# Patient Record
Sex: MALE | Employment: STUDENT | URBAN - METROPOLITAN AREA
[De-identification: names, ages, dates, MRNs, and addresses within clinical notes are randomized per-mention and may not be internally consistent; named-entity substitution may affect disease eponyms.]

---

## 2017-03-23 ENCOUNTER — OFFICE VISIT (OUTPATIENT)
Dept: DERMATOLOGY | Facility: CLINIC | Age: 22
End: 2017-03-23

## 2017-03-23 DIAGNOSIS — L63.9 AA (ALOPECIA AREATA): Primary | ICD-10-CM

## 2017-03-23 DIAGNOSIS — L63.9 AA (ALOPECIA AREATA): ICD-10-CM

## 2017-03-23 LAB
DEPRECATED CALCIDIOL+CALCIFEROL SERPL-MC: 20 UG/L (ref 20–75)
FERRITIN SERPL-MCNC: 32 NG/ML (ref 26–388)
IRON SATN MFR SERPL: 24 % (ref 15–46)
IRON SERPL-MCNC: 107 UG/DL (ref 35–180)
TIBC SERPL-MCNC: 453 UG/DL (ref 240–430)
TSH SERPL DL<=0.005 MIU/L-ACNC: 1.66 MU/L (ref 0.4–4)

## 2017-03-23 RX ORDER — CLOBETASOL PROPIONATE 0.05 G/100ML
SHAMPOO TOPICAL
Qty: 118 ML | Refills: 3 | Status: SHIPPED | OUTPATIENT
Start: 2017-03-23 | End: 2017-03-23

## 2017-03-23 RX ORDER — CLOBETASOL PROPIONATE 0.05 G/100ML
SHAMPOO TOPICAL
Qty: 118 ML | Refills: 3 | Status: SHIPPED | OUTPATIENT
Start: 2017-03-23 | End: 2018-10-03

## 2017-03-23 ASSESSMENT — PAIN SCALES - GENERAL: PAINLEVEL: MODERATE PAIN (4)

## 2017-03-23 NOTE — MR AVS SNAPSHOT
After Visit Summary   3/23/2017    Kevin Agee    MRN: [id number]           Patient Information     Date Of Birth          1995        Visit Information        Provider Department      3/23/2017 9:45 AM Lucita Bailey MD [facility] Dermatology        Today's Diagnoses     AA (alopecia areata)    -  1       Follow-ups after your visit        Who to contact     Please call your clinic at [phone] to:    Ask questions about your health    Make or cancel appointments    Discuss your medicines    Learn about your test results    Speak to your doctor            Additional Information About Your Visit        MyChart Information     [employer] gives you secure access to your electronic health record. If you see a primary care provider, you can also send messages to your care team and make appointments. If you have questions, please call your primary care clinic.  If you do not have a primary care provider, please call [phone] and they will assist you.      [employer] is an electronic gateway that provides easy, online access to your medical records. With [employer], you can request a clinic appointment, read your test results, renew a prescription or communicate with your care team.     To access your existing account, please contact your [facility] or call [phone] for assistance.        Care EveryWhere ID     This is your Care EveryWhere ID. This could be used by other organizations to access your [place] medical records  [id number]         Blood Pressure from Last 3 Encounters:   No data found for BP    Weight from Last 3 Encounters:   No data found for Wt              We Performed the Following     FERRITIN     INJECTION INTO SKIN LESIONS >7     IRON AND IRON BINDING CAPACITY     Thyroid Peroxidase and Thyroglob Atb (Qu          Today's Medication Changes          These changes are accurate as of 3/23/17 11:59 PM.  If you have any questions, ask your  nurse or doctor.               Start taking these medicines.        Dose/Directions    clobetasol propionate 0.05 % Sham   Used for:  AA (alopecia areata)   Started by:  Lucita Bailey MD        Apply to entire scalp then wash out three times per week   Quantity:  118 mL   Refills:  3       triamcinolone acetonide 10 MG/ML injection   Commonly known as:  KENALOG   Used for:  AA (alopecia areata)   Started by:  Lucita Bailey MD        Dose:  20 mg   Inject 2 mLs (20 mg) into the skin once for 1 dose   Quantity:  2 mL   Refills:  0            Where to get your medicines      Some of these will need a paper prescription and others can be bought over the counter.  Ask your nurse if you have questions.     Bring a paper prescription for each of these medications     clobetasol propionate 0.05 % Sham       You don't need a prescription for these medications     triamcinolone acetonide 10 MG/ML injection                Primary Care Provider Fax #    Physician No Ref-Primary [phone]       No address on file        Equal Access to Services     RIGO DUONG : Brandon ulricho Sobrittany, waaxda lubenson, qaybta kaalmada ademandy, cheng adkins . So [facility] [phone].    ATENCIÓN: Si patti dimas, tiene a rizvi disposición servicios gratuitos de asistencia lingüística. Llame al [phone].    We comply with applicable federal civil rights laws and [place] laws. We do not discriminate on the basis of race, color, national origin, age, disability, sex, sexual orientation, or gender identity.            Thank you!     Thank you for choosing [facility] DERMATOLOGY  for your care. Our goal is always to provide you with excellent care. Hearing back from our patients is one way we can continue to improve our services. Please take a few minutes to complete the written survey that you may receive in the mail after your visit with us. Thank you!             Your Updated Medication List -  Protect others around you: Learn how to safely use, store and throw away your medicines at www.disposemymeds.org.          This list is accurate as of 3/23/17 11:59 PM.  Always use your most recent med list.                   Brand Name Dispense Instructions for use Diagnosis    clobetasol propionate 0.05 % Sham     118 mL    Apply to entire scalp then wash out three times per week    AA (alopecia areata)       triamcinolone acetonide 10 MG/ML injection    KENALOG    2 mL    Inject 2 mLs (20 mg) into the skin once for 1 dose    AA (alopecia areata)

## 2017-03-23 NOTE — NURSING NOTE
Dermatology Rooming Note    Kevin Agee's goals for this visit include:   Chief Complaint   Patient presents with     Consult     Alopecia Areata, this last month it has been getting worse.      Parisa Jiménez CMA

## 2017-03-25 NOTE — PROGRESS NOTES
[facility] Dermatology Note  Encounter Date: Mar 23, 2017    CC:   Chief Complaint   Patient presents with     Consult     Alopecia Areata, this last month it has been getting worse.      Dermatology Problem List:  -Alopecia Areata: Clobetasol shampoo three times per week, ILK    History of Present Illness:  This 21 year old male presents for evaluation of alopecia areata. He first noticed the patches of hair loss about 2 years ago, but started treatment about a year ago at [facility] where he has received 6 injections with ILK, which have been effective in improving the injected areas, but he continues to get new ones. He has had about 8 new spots in the last 6 months. His last ILK was 1 month ago. He denies any itching or scaling of his scalp. He also denies any loss of eyebrows or body hair, or nail changes. He is otherwise in his usual state of health and denies weight changes, fever/chills, constipation or diarrhea, or mood changes.         The patient's father also has AA, but has never been treated. He denies any history of thyroid disease.     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     Family History:  History reviewed. No pertinent family history.       Medications:  Current Outpatient Prescriptions   Medication Sig Dispense Refill     clobetasol propionate 0.05 % SHAM Apply to entire scalp then wash out three times per week 118 mL 3     Allergies:  No Known Allergies  Review of Systems:  Skin/Heme New Pt: The patient denies frequent sun exposure. The patient denies excessive scarring or problems healing except as per HPI. The patient denies excessive bleeding.  Constitutional: The patient denies fatigue, fevers,  chills, unintended weight loss, and night sweats.    Physical exam:  There were no vitals taken for this visit.  - This is a well developed, well-nourished male in no acute distress, in a pleasant mood.    - Lynn Skin Type IV  - Sun-exposed skin, which includes the head/face, neck, both arms, digits, and/or nails was examined.   - Scattered round patches of hair loss without associated scarring measuring 2-3 cm on the right posterior scalp with scattered small 4-5mm vellus and terminal, with scattered ~1cm areas adjacent, and an additional patch on the left temporal hairline measuring ~1-2cm.  There was mild erythema and scaling of the scalp noted.  Eyelashes and eyebrows are within normal limits. Nails are without pitting, onycholysis or onychodystrophy.            Impression/Plan:  1. Alopecia Areata: Discussed that this is a disease where the body's immune system attacks the hair follicles and that it can be associated with other autoimmune diseases (most commonly thyroid disease)  TSH with free T4 reflex; Iron studies, Vitamin D, Thyroid Peroxidase/Thyroglobulin Antibodies     Clobetasol propionate 0.05 % SHAM; Apply to entire scalp then wash out three times per week  Dispense: 118 mL; Refill: 3  Kenalog intralesional injection procedure note: After verbal consent and discussion of risks including but not limited to atrophy, pain, and bruising, time out was performed, the patient underwent positioning and the area was prepped with isopropyl alcohol, 2.0 total cc of Kenalog 10 mg/cc was injected into 20 lesion(s) on the posterior, and temporal scalp.  The patient tolerated the procedure well and left the Dermatology clinic in good condition.      FERN Ross Ref-Primary, Physician Referred MD Omari  No address on file on close of this encounter.  Follow-up in 4 weeks, earlier for new or changing lesions.     Lucita Bailey MD  PGY-3 Internal Medicine/ Dermatology  [phone]    Adeline Antonio  staffed the patient.    Staff Involved:  Resident(Lucita Bailey)/Staff(as above)

## 2017-04-05 ENCOUNTER — HOSPITAL ENCOUNTER (EMERGENCY)
Facility: CLINIC | Age: 22
Discharge: HOME | End: 2017-04-05
Attending: EMERGENCY MEDICINE | Admitting: EMERGENCY MEDICINE
Payer: COMMERCIAL

## 2017-04-05 VITALS
BODY MASS INDEX: 22.38 KG/M2 | SYSTOLIC BLOOD PRESSURE: 139 MMHG | RESPIRATION RATE: 18 BRPM | OXYGEN SATURATION: 98 % | HEIGHT: 75 IN | TEMPERATURE: 98.9 F | DIASTOLIC BLOOD PRESSURE: 76 MMHG | WEIGHT: 180 LBS

## 2017-04-05 DIAGNOSIS — R19.7 DIARRHEA, UNSPECIFIED TYPE: ICD-10-CM

## 2017-04-05 DIAGNOSIS — R10.31 ABDOMINAL PAIN, RIGHT LOWER QUADRANT: ICD-10-CM

## 2017-04-05 LAB
ALBUMIN SERPL-MCNC: 4.1 G/DL (ref 3.4–5)
ALBUMIN UR-MCNC: 10 MG/DL
ALP SERPL-CCNC: 112 U/L (ref 65–260)
ALT SERPL W P-5'-P-CCNC: 54 U/L (ref 0–50)
ANION GAP SERPL CALCULATED.3IONS-SCNC: 9 MMOL/L (ref 3–14)
APPEARANCE UR: CLEAR
AST SERPL W P-5'-P-CCNC: 27 U/L (ref 0–35)
BACTERIA #/AREA URNS HPF: ABNORMAL /HPF
BASOPHILS # BLD AUTO: 0 10E9/L (ref 0–0.2)
BASOPHILS NFR BLD AUTO: 0.4 %
BILIRUB SERPL-MCNC: 0.4 MG/DL (ref 0.2–1.3)
BILIRUB UR QL STRIP: NEGATIVE
BUN SERPL-MCNC: 18 MG/DL (ref 7–30)
CALCIUM SERPL-MCNC: 9.1 MG/DL (ref 8.5–10.1)
CHLORIDE SERPL-SCNC: 104 MMOL/L (ref 98–110)
CO2 SERPL-SCNC: 23 MMOL/L (ref 20–32)
COLOR UR AUTO: YELLOW
CREAT SERPL-MCNC: 1.44 MG/DL (ref 0.5–1)
DIFFERENTIAL METHOD BLD: NORMAL
EOSINOPHIL # BLD AUTO: 0 10E9/L (ref 0–0.7)
EOSINOPHIL NFR BLD AUTO: 0.4 %
ERYTHROCYTE [DISTWIDTH] IN BLOOD BY AUTOMATED COUNT: 12.9 % (ref 10–15)
GFR SERPL CREATININE-BSD FRML MDRD: 63 ML/MIN/1.7M2
GLUCOSE SERPL-MCNC: 126 MG/DL (ref 70–99)
GLUCOSE UR STRIP-MCNC: NEGATIVE MG/DL
HCT VFR BLD AUTO: 46.3 % (ref 40–53)
HGB BLD-MCNC: 15.6 G/DL (ref 13.3–17.7)
HGB UR QL STRIP: NEGATIVE
IMM GRANULOCYTES # BLD: 0 10E9/L (ref 0–0.4)
IMM GRANULOCYTES NFR BLD: 0.2 %
KETONES UR STRIP-MCNC: 10 MG/DL
LACTATE BLD-SCNC: 1.8 MMOL/L (ref 0.7–2.1)
LEUKOCYTE ESTERASE UR QL STRIP: NEGATIVE
LIPASE SERPL-CCNC: 175 U/L (ref 73–393)
LYMPHOCYTES # BLD AUTO: 0.9 10E9/L (ref 0.8–5.3)
LYMPHOCYTES NFR BLD AUTO: 21.1 %
MCH RBC QN AUTO: 30.9 PG (ref 26.5–33)
MCHC RBC AUTO-ENTMCNC: 33.7 G/DL (ref 31.5–36.5)
MCV RBC AUTO: 92 FL (ref 78–100)
MONOCYTES # BLD AUTO: 1.2 10E9/L (ref 0–1.3)
MONOCYTES NFR BLD AUTO: 26.3 %
MUCOUS THREADS #/AREA URNS LPF: PRESENT /LPF
NEUTROPHILS # BLD AUTO: 2.3 10E9/L (ref 1.6–8.3)
NEUTROPHILS NFR BLD AUTO: 51.6 %
NITRATE UR QL: NEGATIVE
NRBC # BLD AUTO: 0 10*3/UL
NRBC BLD AUTO-RTO: 0 /100
PH UR STRIP: 6 PH (ref 5–7)
PLATELET # BLD AUTO: 160 10E9/L (ref 150–450)
PLATELET # BLD EST: NORMAL 10*3/UL
POTASSIUM SERPL-SCNC: 4 MMOL/L (ref 3.4–5.3)
PROT SERPL-MCNC: 7.9 G/DL (ref 6.8–8.8)
RBC # BLD AUTO: 5.05 10E12/L (ref 4.4–5.9)
RBC #/AREA URNS AUTO: <1 /HPF (ref 0–2)
SODIUM SERPL-SCNC: 136 MMOL/L (ref 133–144)
SP GR UR STRIP: 1.02 (ref 1–1.03)
SQUAMOUS #/AREA URNS AUTO: 1 /HPF (ref 0–1)
TRANS CELLS #/AREA URNS HPF: 1 /HPF (ref 0–1)
URN SPEC COLLECT METH UR: ABNORMAL
UROBILINOGEN UR STRIP-MCNC: NORMAL MG/DL (ref 0–2)
WBC # BLD AUTO: 4.5 10E9/L (ref 4–11)
WBC #/AREA URNS AUTO: 1 /HPF (ref 0–2)

## 2017-04-05 PROCEDURE — 99284 EMERGENCY DEPT VISIT MOD MDM: CPT | Mod: Z6 | Performed by: EMERGENCY MEDICINE

## 2017-04-05 PROCEDURE — 83690 ASSAY OF LIPASE: CPT | Performed by: EMERGENCY MEDICINE

## 2017-04-05 PROCEDURE — 85025 COMPLETE CBC W/AUTO DIFF WBC: CPT | Performed by: EMERGENCY MEDICINE

## 2017-04-05 PROCEDURE — 25000128 H RX IP 250 OP 636: Performed by: EMERGENCY MEDICINE

## 2017-04-05 PROCEDURE — 96374 THER/PROPH/DIAG INJ IV PUSH: CPT | Performed by: EMERGENCY MEDICINE

## 2017-04-05 PROCEDURE — 96361 HYDRATE IV INFUSION ADD-ON: CPT | Performed by: EMERGENCY MEDICINE

## 2017-04-05 PROCEDURE — 83605 ASSAY OF LACTIC ACID: CPT | Performed by: EMERGENCY MEDICINE

## 2017-04-05 PROCEDURE — 80053 COMPREHEN METABOLIC PANEL: CPT | Performed by: EMERGENCY MEDICINE

## 2017-04-05 PROCEDURE — 99284 EMERGENCY DEPT VISIT MOD MDM: CPT | Mod: 25 | Performed by: EMERGENCY MEDICINE

## 2017-04-05 PROCEDURE — 81001 URINALYSIS AUTO W/SCOPE: CPT | Performed by: EMERGENCY MEDICINE

## 2017-04-05 RX ORDER — LIDOCAINE 40 MG/G
CREAM TOPICAL
Status: DISCONTINUED | OUTPATIENT
Start: 2017-04-05 | End: 2017-04-05 | Stop reason: HOSPADM

## 2017-04-05 RX ORDER — SODIUM CHLORIDE 9 MG/ML
INJECTION, SOLUTION INTRAVENOUS CONTINUOUS
Status: DISCONTINUED | OUTPATIENT
Start: 2017-04-05 | End: 2017-04-05 | Stop reason: HOSPADM

## 2017-04-05 RX ORDER — ONDANSETRON 2 MG/ML
4 INJECTION INTRAMUSCULAR; INTRAVENOUS ONCE
Status: COMPLETED | OUTPATIENT
Start: 2017-04-05 | End: 2017-04-05

## 2017-04-05 RX ADMIN — ONDANSETRON 4 MG: 2 INJECTION INTRAMUSCULAR; INTRAVENOUS at 09:17

## 2017-04-05 RX ADMIN — SODIUM CHLORIDE 2000 ML: 9 INJECTION, SOLUTION INTRAVENOUS at 09:17

## 2017-04-05 ASSESSMENT — ENCOUNTER SYMPTOMS
DIARRHEA: 1
APPETITE CHANGE: 1
VOMITING: 0
ARTHRALGIAS: 0
CONFUSION: 0
COLOR CHANGE: 0
ABDOMINAL PAIN: 1
FEVER: 1
NAUSEA: 1
SHORTNESS OF BREATH: 0
HEADACHES: 0
DIFFICULTY URINATING: 0
BLOOD IN STOOL: 0
NECK STIFFNESS: 0
EYE REDNESS: 0

## 2017-04-05 NOTE — ED NOTES
Kevin presents with c/o RLQ abdominal pain that radiates to his umbilicus since yesterday. He also reports nausea, one episode of watery stool and a temp of 101. Denies sick contacts, recent travel or rash.Denies relevant medical history or current medications.

## 2017-04-05 NOTE — ED PROVIDER NOTES
History     Chief Complaint   Patient presents with     Abdominal Pain     HPI  Kevin Agee is a 21 year old male who presents to the Emergency Department for evaluation of abdominal pain. Last night, the patient developed a fever recorded at 101 F and nausea. This morning he woke around 1:00 AM (~9 hours ago) with generalized abdominal pain, worse in the RLQ, described as an ache. His pain has been intermittent, lasting anywhere from a few minutes to 30 minutes, and has been increasing in frequency and raditing to his right lower back. Twisting his abdomen and palpitations exacerbates his pain. He has not taking anything for his discomfort and currently radiates it at 3/10. Additionally, he reports 2 watery diarrhea episodes per day for the past week, attributing it to eating dried mangos. He has not taken anything for the diarrhea. He has not experienced these symptoms before. His last meal was last night and states he does not have an appetite this morning. He denies vomiting, bloody stools or any other concerns or complaints at this time. No abdominal surgical history. No history or known family history of kidney stones. No recent travel. No antibiotic use. Not anticoagulated on aspirin or otherwise.    Past Medical History:   Diagnosis Date     Flat feet, bilateral      Migraine        Past Surgical History:   Procedure Laterality Date     ANKLE SURGERY      To fix bilateral flat feet       No family history on file.    Social History   Substance Use Topics     Smoking status: Never Smoker     Smokeless tobacco: Not on file     Alcohol use 0.0 oz/week     0 Standard drinks or equivalent per week      Comment: binge       Current Facility-Administered Medications   Medication     lidocaine 1 % 1 mL     lidocaine (LMX4) kit     sodium chloride (PF) 0.9% PF flush 3 mL     sodium chloride (PF) 0.9% PF flush 3 mL     0.9% sodium chloride infusion     Current Outpatient Prescriptions   Medication      "clobetasol propionate 0.05 % SHAM      No Known Allergies    I have reviewed the Medications, Allergies, Past Medical and Surgical History, and Social History in the Epic system.    Review of Systems   Constitutional: Positive for appetite change (decreased) and fever (101 F).   HENT: Negative for congestion.    Eyes: Negative for redness.   Respiratory: Negative for shortness of breath.    Cardiovascular: Negative for chest pain.   Gastrointestinal: Positive for abdominal pain, diarrhea and nausea. Negative for blood in stool and vomiting.   Genitourinary: Negative for difficulty urinating.   Musculoskeletal: Negative for arthralgias and neck stiffness.   Skin: Negative for color change.   Neurological: Negative for headaches.   Psychiatric/Behavioral: Negative for confusion.   All other systems reviewed and are negative.      Physical Exam   BP: 120/67  Heart Rate: 97  Temp: 98.9  F (37.2  C)  Height: 190.5 cm (6' 3\")  Weight: 81.6 kg (180 lb)  SpO2: 99 %  Physical Exam   Constitutional: No distress.   HENT:   Head: Atraumatic.   Mouth/Throat: Oropharynx is clear and moist. No oropharyngeal exudate.   Eyes: Pupils are equal, round, and reactive to light. No scleral icterus.   Cardiovascular: Normal heart sounds and intact distal pulses.    Pulmonary/Chest: Breath sounds normal. No respiratory distress.   Abdominal: Soft. Bowel sounds are normal. There is no tenderness.   Musculoskeletal: He exhibits no edema or tenderness.   Skin: Skin is warm. No rash noted. He is not diaphoretic.       ED Course     8:58 AM  The patient was seen and examined by Dr. Zaragoza in Room 15.     ED Course     Procedures        Results for orders placed or performed during the hospital encounter of 04/05/17   CBC with platelets differential   Result Value Ref Range    WBC 4.5 4.0 - 11.0 10e9/L    RBC Count 5.05 4.4 - 5.9 10e12/L    Hemoglobin 15.6 13.3 - 17.7 g/dL    Hematocrit 46.3 40.0 - 53.0 %    MCV 92 78 - 100 fl    MCH 30.9 26.5 - " 33.0 pg    MCHC 33.7 31.5 - 36.5 g/dL    RDW 12.9 10.0 - 15.0 %    Platelet Count 160 150 - 450 10e9/L    Diff Method Automated Method     % Neutrophils 51.6 %    % Lymphocytes 21.1 %    % Monocytes 26.3 %    % Eosinophils 0.4 %    % Basophils 0.4 %    % Immature Granulocytes 0.2 %    Nucleated RBCs 0 0 /100    Absolute Neutrophil 2.3 1.6 - 8.3 10e9/L    Absolute Lymphocytes 0.9 0.8 - 5.3 10e9/L    Absolute Monocytes 1.2 0.0 - 1.3 10e9/L    Absolute Eosinophils 0.0 0.0 - 0.7 10e9/L    Absolute Basophils 0.0 0.0 - 0.2 10e9/L    Abs Immature Granulocytes 0.0 0 - 0.4 10e9/L    Absolute Nucleated RBC 0.0     Platelet Estimate Confirming automated cell count    Comprehensive metabolic panel   Result Value Ref Range    Sodium 136 133 - 144 mmol/L    Potassium 4.0 3.4 - 5.3 mmol/L    Chloride 104 98 - 110 mmol/L    Carbon Dioxide 23 20 - 32 mmol/L    Anion Gap 9 3 - 14 mmol/L    Glucose 126 (H) 70 - 99 mg/dL    Urea Nitrogen 18 7 - 30 mg/dL    Creatinine 1.44 (H) 0.50 - 1.00 mg/dL    GFR Estimate 63 >60 mL/min/1.7m2    GFR Estimate If Black 76 >60 mL/min/1.7m2    Calcium 9.1 8.5 - 10.1 mg/dL    Bilirubin Total 0.4 0.2 - 1.3 mg/dL    Albumin 4.1 3.4 - 5.0 g/dL    Protein Total 7.9 6.8 - 8.8 g/dL    Alkaline Phosphatase 112 65 - 260 U/L    ALT 54 (H) 0 - 50 U/L    AST 27 0 - 35 U/L   Routine UA with microscopic   Result Value Ref Range    Color Urine Yellow     Appearance Urine Clear     Glucose Urine Negative NEG mg/dL    Bilirubin Urine Negative NEG    Ketones Urine 10 (A) NEG mg/dL    Specific Gravity Urine 1.025 1.003 - 1.035    Blood Urine Negative NEG    pH Urine 6.0 5.0 - 7.0 pH    Protein Albumin Urine 10 (A) NEG mg/dL    Urobilinogen mg/dL Normal 0.0 - 2.0 mg/dL    Nitrite Urine Negative NEG    Leukocyte Esterase Urine Negative NEG    Source Midstream Urine     WBC Urine 1 0 - 2 /HPF    RBC Urine <1 0 - 2 /HPF    Bacteria Urine Few (A) NEG /HPF    Squamous Epithelial /HPF Urine 1 0 - 1 /HPF    Transitional Epi 1 0 -  1 /HPF    Mucous Urine Present (A) NEG /LPF   Lactic acid   Result Value Ref Range    Lactic Acid 1.8 0.7 - 2.1 mmol/L   Lipase   Result Value Ref Range    Lipase 175 73 - 393 U/L     Medications   lidocaine 1 % 1 mL (not administered)   lidocaine (LMX4) kit (not administered)   sodium chloride (PF) 0.9% PF flush 3 mL (not administered)   sodium chloride (PF) 0.9% PF flush 3 mL (3 mLs Intracatheter Given 4/5/17 0919)   0.9% sodium chloride infusion (not administered)   0.9% sodium chloride BOLUS (0 mLs Intravenous Stopped 4/5/17 1111)   ondansetron (ZOFRAN) injection 4 mg (4 mg Intravenous Given 4/5/17 0917)            Labs Ordered and Resulted from Time of ED Arrival Up to the Time of Departure from the ED   COMPREHENSIVE METABOLIC PANEL - Abnormal; Notable for the following:        Result Value    Glucose 126 (*)     Creatinine 1.44 (*)     ALT 54 (*)     All other components within normal limits   ROUTINE UA WITH MICROSCOPIC - Abnormal; Notable for the following:     Ketones Urine 10 (*)     Protein Albumin Urine 10 (*)     Bacteria Urine Few (*)     Mucous Urine Present (*)     All other components within normal limits   CBC WITH PLATELETS DIFFERENTIAL   LACTIC ACID WHOLE BLOOD   LIPASE   PERIPHERAL IV CATHETER       Assessments & Plan (with Medical Decision Making)   21-year-old male presents for evaluation of 7 day history of diarrhea, crampy abdominal pain now localizing to right lower quadrant and low grade temperature.  Exam was entirely unremarkable including abdominal exam revealing no evidence of tenderness.  Laboratories were obtained revealing a CBC that was normal.  Comprehensive metabolic panel revealed slight elevation in glucose 126 and creatinine of 1.44 consistent with stress response and diarrhea.  Lactic acid, lipase and urinalysis were grossly unremarkable.  Patient received fluids through IV and Zofran.  He felt significantly improved.  Repeat exam was unremarkable.  We discussed etiology of  his pain which includes gastroenteritis, colitis, appendicitis.  At this point as patient has significantly improved and has no abdominal findings, we have deferred imaging.  I've asked him to return to the emergency room with recurrence or worsening of any of his symptoms.    I have reviewed the nursing notes.    I have reviewed the findings, diagnosis, plan and need for follow up with the patient.    New Prescriptions    No medications on file       Final diagnoses:   Abdominal pain, right lower quadrant   Diarrhea, unspecified type     I, Leonora Hein, am serving as a trained medical scribe to document services personally performed by Albert Zaragoza MD, based on the provider's statements to me.      I, Albert Zaragoza MD, was physically present and have reviewed and verified the accuracy of this note documented by Leonora Hein.     4/5/2017   [facility], [place], EMERGENCY DEPARTMENT     Albert Zaragoza MD  04/05/17 0573

## 2017-04-05 NOTE — DISCHARGE INSTRUCTIONS
Treating Diarrhea  Diarrhea occurs when you have loose, watery, or frequent bowel movements. It is a common problem with many causes. Most cases of diarrhea clear up on their own. But certain cases may need treatment. Be sure to see your health care provider if your symptoms do not improve within a few days.    Getting Relief  Treatment of diarrhea depends on its cause. Diarrhea caused by bacterial or parasite infection is often treated with antibiotics. Diarrhea caused by other factors, such as a stomach virus, often improves with simple home treatment. The tips below may also help relieve your symptoms.    Drink plenty of fluids. This helps prevent too much fluid loss (dehydration). Water, clear soups, and electrolyte solutions are good choices. Avoid alcohol, coffee, tea, and milk. These can make symptoms worse.    Suck on ice chips if drinking makes you queasy.    Return to your normal diet slowly. You may want to eat bland foods at first, such as rice and toast. Also, you may need to avoid certain foods for a while, such as dairy products. These can make symptoms worse. Ask your health care provider if there are any other foods you should avoid.    If you were prescribed antibiotics, take them as directed.    Do not take anti-diarrhea medications without asking your health care provider first.  Call Your Health Care Provider If You Have:    Fever of 102 F (38.0 C) or higher    Severe pain    Worsening diarrhea or diarrhea for more than 2 days    Bloody vomit or stool    Signs of dehydration (dizziness, dry mouth and tongue, rapid pulse, dark urine)    0164-3232 [employer]. [address] [place] [address], [place] [address]. All rights reserved. This information is not intended as a substitute for professional medical care. Always follow your healthcare professional's instructions.          Uncertain Causes of Diarrhea (Adult)    Diarrhea is when stools are loose and watery. This can be caused  by:    Viral infections    Bacterial infections    Food poisoning    Parasites    Irritable bowel syndrome (IBS)    Inflammatory bowel diseases such as ulcerative colitis, Crohn's disease, and celiac disease    Food intolerance, such as to lactose, the sugar found in milk and milk products    Reaction to medicines like antibiotics, laxatives, cancer drugs, and antacids  Along with diarrhea, you may also have:    Abdominal pain and cramping    Nausea and vomiting    Loss of bowel control    Fever and chills    Bloody stools  In some cases, antibiotics may help to treat diarrhea. You may have a stool sample test. This is done to see what is causing your diarrhea, and if antibiotics will help treat it. The results of a stool sample test may take up to 2 days. The healthcare provider may not give you antibiotics until he or she has the stool test results.  Diarrhea can cause dehydration. This is the loss of too much water and other fluids from the body. When this occurs, body fluid must be replaced. This can be done with oral rehydration solutions. Oral rehydration solutions are available at drugstores and grocery stores without a prescription.  Home care  Follow all instructions given by your healthcare provider. Rest at home for the next 24 hours, or until you feel better. Avoid caffeine, tobacco, and alcohol. These can make diarrhea, cramping, and pain worse.  If taking medicines:    Don t take over-the-counter diarrhea or nausea medicines unless your healthcare provider tells you to.    You may use acetaminophen or NSAID medicines like ibuprofen or naproxen to reduce pain and fever. Don t use these if you have chronic liver or kidney disease, or ever had a stomach ulcer or gastrointestinal bleeding. Don't use NSAID medicines if you are already taking one for another condition (like arthritis) or are on daily aspirin therapy (such as for heart disease or after a stroke). Talk with your healthcare provider  first.    If antibiotics were prescribed, be sure you take them until they are finished. Don t stop taking them even when you feel better. Antibiotics must be taken as a full course.  To prevent the spread of illness:    Remember that washing with soap and water and using alcohol-based  is the best way to prevent the spread of infection.    Clean the toilet after each use.    Wash your hands before eating.    Wash your hands before and after preparing food. Keep in mind that people with diarrhea or vomiting should not prepare food for others.    Wash your hands after using cutting boards, countertops, and knives that have been in contact with raw foods.    Wash and then peel fruits and vegetables.    Keep uncooked meats away from cooked and ready-to-eat foods.    Use a food thermometer when cooking. Cook poultry to at least 165 F (74 C). Cook ground meat (beef, veal, pork, lamb) to at least 160 F (71 C). Cook fresh beef, veal, lamb, and pork to at least 145 F (63 C).    Don t eat raw or undercooked eggs (poached or bee side up), poultry, meat, or unpasteurized milk and juices.  Food and drinks  The main goal while treating vomiting or diarrhea is to prevent dehydration. This is done by taking small amounts of liquids often.    Keep in mind that liquids are more important than food right now.    Drink only small amounts of liquids at a time.    Don t force yourself to eat, especially if you are having cramping, vomiting, or diarrhea. Don t eat large amounts at a time, even if you are hungry.    If you eat, avoid fatty, greasy, spicy, or fried foods.    Don t eat dairy foods or drink milk if you have diarrhea. These can make diarrhea worse.  During the first 24 hours you can try:    Oral rehydration solutions. Do not use sports drinks. They have too much sugar and not enough electrolytes.    Soft drinks without caffeine    Ginger ale    Water (plain or flavored)    Decaf tea or coffee    Clear broth,  consommé, or bouillon    Gelatin, popsicles, or frozen fruit juice bars  The second 24 hours, if you are feeling better, you can add:    Hot cereal, plain toast, bread, rolls, or crackers    Plain noodles, rice, mashed potatoes, chicken noodle soup, or rice soup    Unsweetened canned fruit (no pineapple)    Bananas  As you recover:    Limit fat intake to less than 15 grams per day. Don t eat margarine, butter, oils, mayonnaise, sauces, gravies, fried foods, peanut butter, meat, poultry, or fish.    Limit fiber. Don t eat raw or cooked vegetables, fresh fruits except bananas, or bran cereals.    Limit caffeine and chocolate.    Limit dairy.    Don t use spices or seasonings except salt.    Go back to your normal diet over time, as you feel better and your symptoms improve.    If the symptoms come back, go back to a simple diet or clear liquids.  Follow-up care  Follow up with your healthcare provider, or as advised. If a stool sample was taken or cultures were done, call the healthcare provider for the results as instructed.  Call 911  Call 911 if you have any of these symptoms:    Trouble breathing    Confusion    Extreme drowsiness or trouble walking    Loss of consciousness    Rapid heart rate    Chest pain    Stiff neck    Seizure  When to seek medical advice  Call your healthcare provider right away if any of these occur:    Abdominal pain that gets worse    Constant lower right abdominal pain    Continued vomiting and inability to keep liquids down    Diarrhea more than 5 times a day    Blood in vomit or stool    Dark urine or no urine for 8 hours, dry mouth and tongue, tiredness, weakness, or dizziness    Drowsiness    New rash    You don t get better in 2 to 3 days    Fever of 100.4 F (38 C) or higher that doesn t get lower with medicine    6129-3922 [employer]. [address] [place] [address], [place] [address]. All rights reserved. This information is not intended as a substitute for professional  medical care. Always follow your healthcare professional's instructions.          Diet for Vomiting or Diarrhea (Adult)    If your symptoms return or get worse after eating certain foods listed below, you should stop eating them until your symptoms ease and you feel better.  Once the vomiting stops, then follow the steps below.   During the first 12 to 24 hours  During the first 12 to 24 hours, follow this diet:    Beverages. Plain water, sport drinks like electrolyte solutions, soft drinks without caffeine, mineral water (plain or flavored), clear fruit juices, and decaffeinated tea and coffee.    Soups. Clear broth, consommé, and bouillon.    Desserts. Plain gelatin, popsicles, and fruit juice bars. As you feel better, you may add 6 to 8 ounces of yogurt per day. If you have diarrhea, don't have foods or beverages that contain sugar, high-fructose corn syrup, or sugar alcohols.  During the next 24 hours  During the next 24 hours you may add the following to the above:    Hot cereal, plain toast, bread, rolls, and crackers    Plain noodles, rice, mashed potatoes, and chicken noodle or rice soup    Unsweetened canned fruit (but not pineapple) and bananas  Don't have more than 15 grams of fat a day. Do this by staying away from margarine, butter, oils, mayonnaise, sauces, gravies, fried foods, peanut butter, meat, poultry, and fish.  Don't eat much fiber. Stay away from raw or cooked vegetables, fresh fruits (except bananas), and bran cereals.  Limit how much caffeine and chocolate you have. Do not use any spices or seasonings except salt.  During the next 24 hours  Gradually go back to your normal diet, as you feel better and your symptoms ease.    2704-9982 [employer]. [address] [place] [address], [place] [address]. All rights reserved. This information is not intended as a substitute for professional medical care. Always follow your healthcare professional's instructions.        * Abdominal  Pain,Uncertain Cause [Male]  Based on your visit today, the exact cause of your abdominal (stomach) pain is not clear. Your exam and tests do not indicate a dangerous cause at this time. However, the signs of a serious problem may take more time to appear. Although your exam was reassuring today, sometimes early in the course of many conditions, exam and lab tests can appear normal. Therefore, it is important for you to watch for any new symptoms or worsening of your condition.  Causes:  It may not be obvious what caused your symptoms. Pay attention to things that do seem to make your symptoms worse or better and discuss this with your doctor when you follow up.  Diagnosis:  The evaluation of abdominal pain in the emergency department may only require an exam by the doctor or it may include blood, urine or imaging studies, depending on many factors. Sometimes exams and tests can identify a cause but in many cases, a clear cause is not found. Further testing at follow up visits may help to suggest a clear diagnosis.  Home Care:    Rest as much as possible until your next exam.    Try to avoid any medications (unless otherwise directed by your doctor), foods, activities, or other factors that you may have contributed to your symptoms.    Try to eat foods that you know that you have tolerated well in the past. Certain diets may be recommended for some conditions that cause abdominal pain. However, since the cause of your symptoms may not be clear, discuss your diet more with your primary care provider or specialist for further recommendations.     Eating several small meals per day as opposed to 2 or 3 larger meals may help.    Monitor closely for anything that may make your symptoms worse or better. Pay close attention to symptoms below that may indicate worsening of your condition.  Follow Up And Precautions:  See your doctor or this facility as instructed (or sooner, if your symptoms are not improving). In some  cases, you may need more testing.  Contact Your Doctor Or Seek Medical Attention  if any of the following occur:    Pain is becoming worse    You are unable to take your medications because of too much vomiting    Swelling of the abdomen    Fever of 101 F (38.3 C) or higher, or as directed by your health care provider    Blood in vomit or bowel movements (dark red or black color)    Jaundice (yellow color of eyes and skin)    New onset of weakness, dizziness or fainting    New onset of chest, arm, back, neck or jaw pain    0850-1602 [address] [address]. All rights reserved. This information is not intended as a substitute for professional medical care. Always follow your healthcare professional's instructions.

## 2017-04-05 NOTE — ED AVS SNAPSHOT
[facility], [place], Emergency Department    [address] [place] [id number]    Phone:  [phone]                                       Kevin Agee   MRN: [id number]    Department:  [facility], Emergency Department   Date of Visit:  4/5/2017           After Visit Summary Signature Page     I have received my discharge instructions, and my questions have been answered. I have discussed any challenges I see with this plan with the nurse or doctor.    ..........................................................................................................................................  Patient/Patient Representative Signature      ..........................................................................................................................................  Patient Representative Print Name and Relationship to Patient    ..................................................               ................................................  Date                                            Time    ..........................................................................................................................................  Reviewed by Signature/Title    ...................................................              ..............................................  Date                                                            Time

## 2017-04-05 NOTE — LETTER
[facility], [place], EMERGENCY DEPARTMENT  [address] [facility] [address]  [phone]    2017    Kevin Agee  [address] [facility] [place] [address] [place] [address]  [phone] (home)     : 1995      To Whom it may concern:    Kevin Agee was seen in our Emergency Department today, 2017.  I expect his condition to improve.  He may return to work/school.    Sincerely,        Albert Zaragoza

## 2017-04-05 NOTE — ED AVS SNAPSHOT
[facility], Emergency Department    [address] [facility] [place] [address]    Phone:  [phone]                                       Kevin Agee   MRN: [id number]    Department:  [facility], Emergency Department   Date of Visit:  4/5/2017           Patient Information     Date Of Birth          1995        Your diagnoses for this visit were:     Abdominal pain, right lower quadrant     Diarrhea, unspecified type        You were seen by Albert Zaragoza MD.      Follow-up Information     Follow up with [facility], Emergency Department.    Specialty:  EMERGENCY MEDICINE    Why:  In one to 2 days, If symptoms worsen    Contact information:    [address] [facility] [facility] [address] [phone]    Additional information:    The [facility] is located on the corner of [address] and [facility] on the [facility]. It is easily accessible from virtually any point in the [facility] area, via [address] and [id number].        Discharge Instructions           Treating Diarrhea  Diarrhea occurs when you have loose, watery, or frequent bowel movements. It is a common problem with many causes. Most cases of diarrhea clear up on their own. But certain cases may need treatment. Be sure to see your health care provider if your symptoms do not improve within a few days.    Getting Relief  Treatment of diarrhea depends on its cause. Diarrhea caused by bacterial or parasite infection is often treated with antibiotics. Diarrhea caused by other factors, such as a stomach virus, often improves with simple home treatment. The tips below may also help relieve your symptoms.    Drink plenty of fluids. This helps prevent too much fluid loss (dehydration). Water, clear soups, and electrolyte solutions are good choices. Avoid alcohol, coffee, tea, and milk. These can make symptoms worse.    Suck on ice chips if drinking makes you queasy.    Return to your normal diet slowly. You  may want to eat bland foods at first, such as rice and toast. Also, you may need to avoid certain foods for a while, such as dairy products. These can make symptoms worse. Ask your health care provider if there are any other foods you should avoid.    If you were prescribed antibiotics, take them as directed.    Do not take anti-diarrhea medications without asking your health care provider first.  Call Your Health Care Provider If You Have:    Fever of 102 F (38.0 C) or higher    Severe pain    Worsening diarrhea or diarrhea for more than 2 days    Bloody vomit or stool    Signs of dehydration (dizziness, dry mouth and tongue, rapid pulse, dark urine)    [id number] [employer]. [address] [place] [address], [place] [address]. All rights reserved. This information is not intended as a substitute for professional medical care. Always follow your healthcare professional's instructions.          Uncertain Causes of Diarrhea (Adult)    Diarrhea is when stools are loose and watery. This can be caused by:    Viral infections    Bacterial infections    Food poisoning    Parasites    Irritable bowel syndrome (IBS)    Inflammatory bowel diseases such as ulcerative colitis, Crohn's disease, and celiac disease    Food intolerance, such as to lactose, the sugar found in milk and milk products    Reaction to medicines like antibiotics, laxatives, cancer drugs, and antacids  Along with diarrhea, you may also have:    Abdominal pain and cramping    Nausea and vomiting    Loss of bowel control    Fever and chills    Bloody stools  In some cases, antibiotics may help to treat diarrhea. You may have a stool sample test. This is done to see what is causing your diarrhea, and if antibiotics will help treat it. The results of a stool sample test may take up to 2 days. The healthcare provider may not give you antibiotics until he or she has the stool test results.  Diarrhea can cause dehydration. This is the loss of too much water  and other fluids from the body. When this occurs, body fluid must be replaced. This can be done with oral rehydration solutions. Oral rehydration solutions are available at drugstores and grocery stores without a prescription.  Home care  Follow all instructions given by your healthcare provider. Rest at home for the next 24 hours, or until you feel better. Avoid caffeine, tobacco, and alcohol. These can make diarrhea, cramping, and pain worse.  If taking medicines:    Don t take over-the-counter diarrhea or nausea medicines unless your healthcare provider tells you to.    You may use acetaminophen or NSAID medicines like ibuprofen or naproxen to reduce pain and fever. Don t use these if you have chronic liver or kidney disease, or ever had a stomach ulcer or gastrointestinal bleeding. Don't use NSAID medicines if you are already taking one for another condition (like arthritis) or are on daily aspirin therapy (such as for heart disease or after a stroke). Talk with your healthcare provider first.    If antibiotics were prescribed, be sure you take them until they are finished. Don t stop taking them even when you feel better. Antibiotics must be taken as a full course.  To prevent the spread of illness:    Remember that washing with soap and water and using alcohol-based  is the best way to prevent the spread of infection.    Clean the toilet after each use.    Wash your hands before eating.    Wash your hands before and after preparing food. Keep in mind that people with diarrhea or vomiting should not prepare food for others.    Wash your hands after using cutting boards, countertops, and knives that have been in contact with raw foods.    Wash and then peel fruits and vegetables.    Keep uncooked meats away from cooked and ready-to-eat foods.    Use a food thermometer when cooking. Cook poultry to at least 165 F (74 C). Cook ground meat (beef, veal, pork, lamb) to at least 160 F (71 C). Cook fresh beef,  veal, lamb, and pork to at least 145 F (63 C).    Don t eat raw or undercooked eggs (poached or bee side up), poultry, meat, or unpasteurized milk and juices.  Food and drinks  The main goal while treating vomiting or diarrhea is to prevent dehydration. This is done by taking small amounts of liquids often.    Keep in mind that liquids are more important than food right now.    Drink only small amounts of liquids at a time.    Don t force yourself to eat, especially if you are having cramping, vomiting, or diarrhea. Don t eat large amounts at a time, even if you are hungry.    If you eat, avoid fatty, greasy, spicy, or fried foods.    Don t eat dairy foods or drink milk if you have diarrhea. These can make diarrhea worse.  During the first 24 hours you can try:    Oral rehydration solutions. Do not use sports drinks. They have too much sugar and not enough electrolytes.    Soft drinks without caffeine    Ginger ale    Water (plain or flavored)    Decaf tea or coffee    Clear broth, consommé, or bouillon    Gelatin, popsicles, or frozen fruit juice bars  The second 24 hours, if you are feeling better, you can add:    Hot cereal, plain toast, bread, rolls, or crackers    Plain noodles, rice, mashed potatoes, chicken noodle soup, or rice soup    Unsweetened canned fruit (no pineapple)    Bananas  As you recover:    Limit fat intake to less than 15 grams per day. Don t eat margarine, butter, oils, mayonnaise, sauces, gravies, fried foods, peanut butter, meat, poultry, or fish.    Limit fiber. Don t eat raw or cooked vegetables, fresh fruits except bananas, or bran cereals.    Limit caffeine and chocolate.    Limit dairy.    Don t use spices or seasonings except salt.    Go back to your normal diet over time, as you feel better and your symptoms improve.    If the symptoms come back, go back to a simple diet or clear liquids.  Follow-up care  Follow up with your healthcare provider, or as advised. If a stool sample was  taken or cultures were done, call the healthcare provider for the results as instructed.  Call 911  Call 911 if you have any of these symptoms:    Trouble breathing    Confusion    Extreme drowsiness or trouble walking    Loss of consciousness    Rapid heart rate    Chest pain    Stiff neck    Seizure  When to seek medical advice  Call your healthcare provider right away if any of these occur:    Abdominal pain that gets worse    Constant lower right abdominal pain    Continued vomiting and inability to keep liquids down    Diarrhea more than 5 times a day    Blood in vomit or stool    Dark urine or no urine for 8 hours, dry mouth and tongue, tiredness, weakness, or dizziness    Drowsiness    New rash    You don t get better in 2 to 3 days    Fever of 100.4 F (38 C) or higher that doesn t get lower with medicine    6926-8201 [employer]. [address] [place] [address]. All rights reserved. This information is not intended as a substitute for professional medical care. Always follow your healthcare professional's instructions.          Diet for Vomiting or Diarrhea (Adult)    If your symptoms return or get worse after eating certain foods listed below, you should stop eating them until your symptoms ease and you feel better.  Once the vomiting stops, then follow the steps below.   During the first 12 to 24 hours  During the first 12 to 24 hours, follow this diet:    Beverages. Plain water, sport drinks like electrolyte solutions, soft drinks without caffeine, mineral water (plain or flavored), clear fruit juices, and decaffeinated tea and coffee.    Soups. Clear broth, consommé, and bouillon.    Desserts. Plain gelatin, popsicles, and fruit juice bars. As you feel better, you may add 6 to 8 ounces of yogurt per day. If you have diarrhea, don't have foods or beverages that contain sugar, high-fructose corn syrup, or sugar alcohols.  During the next 24 hours  During the next 24 hours you may  add the following to the above:    Hot cereal, plain toast, bread, rolls, and crackers    Plain noodles, rice, mashed potatoes, and chicken noodle or rice soup    Unsweetened canned fruit (but not pineapple) and bananas  Don't have more than 15 grams of fat a day. Do this by staying away from margarine, butter, oils, mayonnaise, sauces, gravies, fried foods, peanut butter, meat, poultry, and fish.  Don't eat much fiber. Stay away from raw or cooked vegetables, fresh fruits (except bananas), and bran cereals.  Limit how much caffeine and chocolate you have. Do not use any spices or seasonings except salt.  During the next 24 hours  Gradually go back to your normal diet, as you feel better and your symptoms ease.    [phone] [employer]. [address] [place] [address]. All rights reserved. This information is not intended as a substitute for professional medical care. Always follow your healthcare professional's instructions.        * Abdominal Pain,Uncertain Cause [Male]  Based on your visit today, the exact cause of your abdominal (stomach) pain is not clear. Your exam and tests do not indicate a dangerous cause at this time. However, the signs of a serious problem may take more time to appear. Although your exam was reassuring today, sometimes early in the course of many conditions, exam and lab tests can appear normal. Therefore, it is important for you to watch for any new symptoms or worsening of your condition.  Causes:  It may not be obvious what caused your symptoms. Pay attention to things that do seem to make your symptoms worse or better and discuss this with your doctor when you follow up.  Diagnosis:  The evaluation of abdominal pain in the emergency department may only require an exam by the doctor or it may include blood, urine or imaging studies, depending on many factors. Sometimes exams and tests can identify a cause but in many cases, a clear cause is not found. Further  testing at follow up visits may help to suggest a clear diagnosis.  Home Care:    Rest as much as possible until your next exam.    Try to avoid any medications (unless otherwise directed by your doctor), foods, activities, or other factors that you may have contributed to your symptoms.    Try to eat foods that you know that you have tolerated well in the past. Certain diets may be recommended for some conditions that cause abdominal pain. However, since the cause of your symptoms may not be clear, discuss your diet more with your primary care provider or specialist for further recommendations.     Eating several small meals per day as opposed to 2 or 3 larger meals may help.    Monitor closely for anything that may make your symptoms worse or better. Pay close attention to symptoms below that may indicate worsening of your condition.  Follow Up And Precautions:  See your doctor or this facility as instructed (or sooner, if your symptoms are not improving). In some cases, you may need more testing.  Contact Your Doctor Or Seek Medical Attention  if any of the following occur:    Pain is becoming worse    You are unable to take your medications because of too much vomiting    Swelling of the abdomen    Fever of 101 F (38.3 C) or higher, or as directed by your health care provider    Blood in vomit or bowel movements (dark red or black color)    Jaundice (yellow color of eyes and skin)    New onset of weakness, dizziness or fainting    New onset of chest, arm, back, neck or jaw pain    5354-2001 [place] [address]. All rights reserved. This information is not intended as a substitute for professional medical care. Always follow your healthcare professional's instructions.          Future Appointments        Provider Department Dept Phone Center    4/26/2017 10:45 AM [facility] [phone] [facility]      24 Hour Appointment Hotline       To  make an appointment at any [facility] clinic, call [phone] ([phone]). If you don't have a family doctor or clinic, we will help you find one. [facility] clinics are conveniently located to serve the needs of you and your family.             Review of your medicines      Our records show that you are taking the medicines listed below. If these are incorrect, please call your family doctor or clinic.        Dose / Directions Last dose taken    clobetasol propionate 0.05 % Sham   Quantity:  118 mL        Apply to entire scalp then wash out three times per week   Refills:  3                Procedures and tests performed during your visit     CBC with platelets differential    Comprehensive metabolic panel    Lactic acid    Lipase    Routine UA with microscopic      Orders Needing Specimen Collection     None      Pending Results     No orders found from 4/3/2017 to 4/6/2017.            Pending Culture Results     No orders found from 4/3/2017 to 4/6/2017.            Thank you for choosing [facility]       Thank you for choosing [facility] for your care. Our goal is always to provide you with excellent care. Hearing back from our patients is one way we can continue to improve our services. Please take a few minutes to complete the written survey that you may receive in the mail after you visit with us. Thank you!        [employer] Information     [employer] gives you secure access to your electronic health record. If you see a primary care provider, you can also send messages to your care team and make appointments. If you have questions, please call your primary care clinic.  If you do not have a primary care provider, please call [phone] and they will assist you.        Care EveryWhere ID     This is your Care EveryWhere ID. This could be used by other organizations to access your [facility] medical records  [id number]        After Visit Summary       This is your record. Keep this with you and show to your  community pharmacist(s) and doctor(s) at your next visit.

## 2017-04-06 ENCOUNTER — TELEPHONE (OUTPATIENT)
Dept: NURSING | Facility: CLINIC | Age: 22
End: 2017-04-06

## 2017-04-06 NOTE — TELEPHONE ENCOUNTER
"Call Type: Triage Call    Presenting Problem: Patient has a\" fever\" of 103.4 orally for 2 days  now. Triaged foir flu-like symptoms/Disposition to call provider  within 24 hours.  Triage Note:  Guideline Title: Flu-Like Symptoms ; Fever - Adult  Recommended Disposition: Call Provider within 24 Hours  Original Inclination: Wanted to speak with a nurse  Override Disposition:  Intended Action: Call PCP/HCP  Physician Contacted: No  In high risk group for complications of influenza AND has questions/concerns ?  YES  Signs of dehydration ? NO  Severe breathing problems ? NO  Breathing problems ? NO  Sudden change in mental status ? NO  Choking sensation, cannot swallow own saliva with associated drooling and soft  muffled voice ? NO  New onset of stiff neck causing pain with forward head movement, severe  generalized headache, fever, or altered mental status ? NO  Any temperature elevation in an individual with a weakened immune system OR a  frail elderly person ? NO  Flu-like symptoms associated with a cough and breathing that is becoming more  difficult ? NO  Evaluated by provider AND symptoms worsening when following recommended treatment  plan for 48 hours ? NO  New OR worsening flu-like illness AND in high risk group for complications ? NO  Gradual onset of upper respiratory illness signs and symptoms(If there is any  doubt that symptoms may be an upper respiratory illness, use this guideline,  Flu-Like Symptoms ) ? NO  Severe headache not relieved with 8 hours of home care ? NO  Abdominal pain is present ? NO  Severe breathing problems not related to nasal congestion ? NO  Has urgency, frequency, discolored urine, pain or burning with urination ? NO  Has urgency, frequency, discolored urine, pain or burning with urination ? NO  New skin rash associated with the fever ? NO  Fever associated with heat exposure ? NO  Worsening signs of soft tissue infection ? NO  High to low (but not zero) risk of exposure to Ebola " within the past 21 days ? NO  Physician Instructions:  Care Advice: INFECTION CONTROL  CAUTIONS  Follow public health advisories.  To help prevent a widespread community outbreak of the flu, call the call  center, your clinic or provider's office to discuss any questions or  concerns before going in unless you have severe respiratory or any nervous  system symptoms.  Speak with a  provider within 8 hours if develop flu-like symptoms and are  at risk of complications, including individuals over the age of 50, women  who are 3 months or more pregnant, living in a long-term care facility, or  have a chronic disease (diabetes, lung, heart, or kidney) or a weakened  immune system.  Influenza - Expected Course: - Symptoms start to improve in 3 to 7 days. -  Cough and feeling tired (malaise) may continue for several weeks. - Cough  and extreme fatigue lasting more than 3 weeks needs medical evaluation. -  Remain at home at least 24 hours after being free of fever 100F (37.8C)  without the use of fever reducing medication.  Influenza Prevention - Good Health Habits: - Practice good health habits:  Get 7-8 hours of sleep each night.  Eat healthy foods and drink sugar-free  fluids.  Exercise most days of a week and manage your stress.  - Practice  prevention by covering your mouth and nose with a tissue when sneezing or  coughing and throwing the tissue into the trash after one use.  Wash your  hands often or use an alcohol-based gel or wipe.  Avoid touching your eyes,  nose and mouth.  - Be sure to keep your immunizations up to date. - Avoid  crowds during peak flu season.  - Stay at home when not feeling well and  avoid close contact with people who are sick.  Influenza Respiratory Hygiene: - Cover the nose/mouth tightly with a tissue  when coughing or sneezing. - Use tissue one time and discard in the nearest  waste receptacle. - Wash hands with soap and water or alcohol-based hand  rub for at least 20 seconds after coming  into contact with respiratory  secretions and contaminated objects/materials. - When a tissue is not  available, cough into the bend of the elbow. - Avoid touching your eyes,  nose or mouth. - When ill wear a disposable face mask when around others,  especially around those at high risk for flu-related complications, to help  decrease the likelihood of infecting them.  Antiviral medications - Prescribed medications are available in pills,  liquids or inhaled powder that help prevent or lessen symptoms of viral  illnesses. Antivirals are usually given to persons at a higher risk for  flu-related complications or who are very ill. Most healthy people do not  need to be treated with antiviral drugs. - Recommended medications for use  against the most recently circulating influenza viruses:  oseltamivir  (Tamiflu) and zanamivir (Relenza). - Work best when started within the  first two days of symptoms and continue for at least 5 days after exposure.  - Speak with your provider as soon as possible if exposed to the flu or if  you have new symptoms of the flu.  Influenza - Transmission: - Flu virus is spread through the air in droplets  when a flu-infected person coughs, sneezes or talks and another person  breathes in the droplets, or by touching a surface like a door knob,  telephone, or keyboard that has been contaminated by the droplets and then  touching the mouth, nose, or eyes. - An individual may be passing on the  flu before they have any symptoms. - An individual may continue to be  contagious with the flu for up to 7 days after the symptoms start.  Influenza Prevention - Immunization: * Vaccination each season is the best  protection against getting the flu and its complications. The most common  complication of influenza is pneumonia. * CDC recommends annual flu vaccine  for everyone 6 months and older. Flu viruses change quickly so last year's  vaccine may not protect you from this year's virus. * Flu vaccine  is  available in two forms, a shot or a nasal spray. - The shot contains killed  viruses so you can't pass the flu to anyone else. It is approved for  everyone age 6 months and older. - The nasal spray contains weakened live  flu viruses that won't give you the flu but in rare cases can be passed to  others. It is approved for healthy persons ages 2 to 49 years. It should  NOT be given to pregnant women, those with a chronic medical condition, a  weakened immune system, or to children and adolescents receiving aspirin  therapy. * DO NOT get a flu shot if you: - Have had a severe allergic  reaction to the vaccine in the past - Have an allergy to eggs or any  ingredients in the vaccine - Have ever had Guillain-Chester' Syndrome - Have  a fever that day or are not feeling well * Full effect of the vaccination  takes two weeks. If you are exposed to the flu virus during the two weeks  you may catch the flu. If this year's vaccine does not match the flu  viruses you are exposed to during this flu season, the flu shot won't  protect you.  Influenza Prevention - Personal Hygiene: - Wash your hands with soap and  water often, for at least 20 seconds, especially after you cough or sneeze  or when you have touched a contaminated surface/object (door knob,  telephone, etc.) - If soap and water are not available, use an  alcohol-based hand  containing at least 60% alcohol, rub hands  together until hands are dry. - Avoid putting your hands and fingers to the  face, nose, mouth or eyes. - During the flu season avoid crowded places  (shopping areas, planes, sporting events).

## 2017-04-26 ENCOUNTER — OFFICE VISIT (OUTPATIENT)
Dept: DERMATOLOGY | Facility: CLINIC | Age: 22
End: 2017-04-26

## 2017-04-26 DIAGNOSIS — L63.9 AA (ALOPECIA AREATA): Primary | ICD-10-CM

## 2017-04-26 ASSESSMENT — PAIN SCALES - GENERAL: PAINLEVEL: NO PAIN (0)

## 2017-04-26 NOTE — MR AVS SNAPSHOT
After Visit Summary   4/26/2017    Kevin Agee    MRN: [id number]           Patient Information     Date Of Birth          1995        Visit Information        Provider Department      4/26/2017 10:45 AM Kaylah Schulz PA-C [facility] Dermatology        Today's Diagnoses     AA (alopecia areata)    -  1       Follow-ups after your visit        Who to contact     Please call your clinic at [phone] to:    Ask questions about your health    Make or cancel appointments    Discuss your medicines    Learn about your test results    Speak to your doctor   If you have compliments or concerns about an experience at your clinic, or if you wish to file a complaint, please contact [facility] Patient Relations at [phone] or email us at [email or web address]         Additional Information About Your Visit        MyChart Information     Thingiest gives you secure access to your electronic health record. If you see a primary care provider, you can also send messages to your care team and make appointments. If you have questions, please call your primary care clinic.  If you do not have a primary care provider, please call [phone] and they will assist you.      Saber [employer] is an electronic gateway that provides easy, online access to your medical records. With [employer], you can request a clinic appointment, read your test results, renew a prescription or communicate with your care team.     To access your existing account, please contact your [facility] or call [phone] for assistance.        Care EveryWhere ID     This is your Care EveryWhere ID. This could be used by other organizations to access your [facility] medical records  [id number]         Blood Pressure from Last 3 Encounters:   04/05/17 139/76   11/20/16 116/65    Weight from Last 3 Encounters:   04/05/17 81.6 kg (180 lb) (82 %)*     * Growth percentiles are  based on [facility] 2-20 Years data.              We Performed the Following     INJECTION INTO SKIN LESIONS <=7          Today's Medication Changes          These changes are accurate as of: 4/26/17 11:59 PM.  If you have any questions, ask your nurse or doctor.               Start taking these medicines.        Dose/Directions    triamcinolone acetonide 10 MG/ML injection   Commonly known as:  KENALOG   Used for:  AA (alopecia areata)        Dose:  10 mg   Inject 1 mL (10 mg) into the skin once for 1 dose   Quantity:  5 mL   Refills:  0            Where to get your medicines      Some of these will need a paper prescription and others can be bought over the counter.  Ask your nurse if you have questions.     You don't need a prescription for these medications     triamcinolone acetonide 10 MG/ML injection                Primary Care Provider    Physician No Ref-Primary       NO REF-PRIMARY PHYSICIAN        Equal Access to Services     RIGO DUONG : Brandon Sorensne, wapriyanka rivero, simon kaaljulisa rodriguez, cheng arias. So [facility] [phone].    ATENCIÓN: Si habla español, tiene a rizvi disposición servicios gratuitos de asistencia lingüística. Llame al [phone].    We comply with applicable federal civil rights laws and [place] laws. We do not discriminate on the basis of race, color, national origin, age, disability, sex, sexual orientation, or gender identity.            Thank you!     Thank you for choosing [facility] DERMATOLOGY  for your care. Our goal is always to provide you with excellent care. Hearing back from our patients is one way we can continue to improve our services. Please take a few minutes to complete the written survey that you may receive in the mail after your visit with us. Thank you!             Your Updated Medication List - Protect others around you: Learn how to safely use, store and throw away your medicines at www.disposemymeds.org.           This list is accurate as of: 4/26/17 11:59 PM.  Always use your most recent med list.                   Brand Name Dispense Instructions for use Diagnosis    clobetasol propionate 0.05 % Sham     118 mL    Apply to entire scalp then wash out three times per week    AA (alopecia areata)       MULTIVITAMIN/EXTRA VITAMIN D3 PO           triamcinolone acetonide 10 MG/ML injection    KENALOG    5 mL    Inject 1 mL (10 mg) into the skin once for 1 dose    AA (alopecia areata)

## 2017-04-26 NOTE — NURSING NOTE
"Dermatology Rooming Note    Kevin Agee's goals for this visit include:   Chief Complaint   Patient presents with     Hair Loss     A A follow up, Kevin states \" I think it has gotten better but one area has not responded.\"     Brittany Collins LPN  "

## 2017-04-26 NOTE — LETTER
4/26/2017       RE: Kevin Agee  [address] [facility] [address] [place] [address]     Dear Colleague,    Thank you for referring your patient, Kevin Agee, to the [facility] DERMATOLOGY at [facility]. Please see a copy of my visit note below.    The note is unavailable                           Pictures were placed in Pt's chart today for future reference.            Again, thank you for allowing me to participate in the care of your patient.      Sincerely,    Kaylah Schulz PA-C

## 2018-01-07 ENCOUNTER — HEALTH MAINTENANCE LETTER (OUTPATIENT)
Age: 23
End: 2018-01-07

## 2018-04-08 ENCOUNTER — HOSPITAL ENCOUNTER (EMERGENCY)
Facility: CLINIC | Age: 23
Discharge: HOME | End: 2018-04-08
Attending: EMERGENCY MEDICINE | Admitting: EMERGENCY MEDICINE
Payer: COMMERCIAL

## 2018-04-08 VITALS
DIASTOLIC BLOOD PRESSURE: 68 MMHG | RESPIRATION RATE: 16 BRPM | TEMPERATURE: 97.2 F | OXYGEN SATURATION: 99 % | SYSTOLIC BLOOD PRESSURE: 138 MMHG | WEIGHT: 194 LBS | HEART RATE: 78 BPM | BODY MASS INDEX: 24.25 KG/M2

## 2018-04-08 DIAGNOSIS — S01.81XA FACIAL LACERATION, INITIAL ENCOUNTER: ICD-10-CM

## 2018-04-08 PROCEDURE — 99282 EMERGENCY DEPT VISIT SF MDM: CPT | Mod: Z6 | Performed by: EMERGENCY MEDICINE

## 2018-04-08 PROCEDURE — 99282 EMERGENCY DEPT VISIT SF MDM: CPT | Performed by: EMERGENCY MEDICINE

## 2018-04-08 ASSESSMENT — ENCOUNTER SYMPTOMS
HEADACHES: 0
PHOTOPHOBIA: 0
NAUSEA: 0
VOMITING: 0
WOUND: 1

## 2018-04-08 NOTE — DISCHARGE INSTRUCTIONS
Please make an appointment to follow up with Your Primary Care Provider as needed.    Return to the emergency department for fevers, redness, swelling, pus from the wound or any other problems.

## 2018-04-08 NOTE — ED PROVIDER NOTES
"  History     Chief Complaint   Patient presents with     Head Laceration     HPI  Kevin Agee is a 22 year old male who presents to the Emergency Department for evaluation of a head laceration. Patient reports that he tripped at home around 11:00 AM and hit his head on the kitchen counter. This created a laceration on the left side of his head that he described as a \"gaping\" opening. Patient had friend put bandage on. Patient denies loss of consciousness, nausea, vomiting, headache, change in vision or double vision.     No current facility-administered medications for this encounter.      Current Outpatient Prescriptions   Medication     Multiple Vitamins-Minerals (MULTIVITAMIN/EXTRA VITAMIN D3 PO)     clobetasol propionate 0.05 % SHAM     Past Medical History:   Diagnosis Date     Flat feet, bilateral      Migraine        Past Surgical History:   Procedure Laterality Date     ANKLE SURGERY      To fix bilateral flat feet       No family history on file.    Social History   Substance Use Topics     Smoking status: Never Smoker     Smokeless tobacco: Not on file     Alcohol use 0.0 oz/week     0 Standard drinks or equivalent per week      Comment: binge     No Known Allergies    I have reviewed the Medications, Allergies, Past Medical and Surgical History, and Social History in the Epic system.    Review of Systems   Eyes: Negative for photophobia.        Negative for change in vision   Gastrointestinal: Negative for nausea and vomiting.   Skin: Positive for wound (laceration on left side of head).   Neurological: Negative for headaches.        Negative for LOC   All other systems reviewed and are negative.      Physical Exam   BP: 154/76  Pulse: 75  Temp: 97.2  F (36.2  C)  Resp: 16  Weight: 88 kg (194 lb 0.1 oz)  SpO2: 99 %      Physical Exam   Constitutional: He is oriented to person, place, and time. He appears well-developed and well-nourished.  Non-toxic appearance. He does not appear ill. No distress. "   HENT:   Head: Normocephalic. Head is with laceration.       Eyes: EOM are normal. Pupils are equal, round, and reactive to light. No scleral icterus.   Neck: Trachea normal, normal range of motion and full passive range of motion without pain. Neck supple. No spinous process tenderness and no muscular tenderness present. Normal range of motion present.   Cardiovascular: Normal rate.    Pulmonary/Chest: Effort normal. No respiratory distress.   Neurological: He is alert and oriented to person, place, and time. He has normal strength. He is not disoriented. No cranial nerve deficit or sensory deficit. Coordination and gait normal. GCS eye subscore is 4. GCS verbal subscore is 5. GCS motor subscore is 6.   Skin: Skin is warm and dry. No rash noted. No pallor.   Psychiatric: He has a normal mood and affect. His behavior is normal.   Nursing note and vitals reviewed.      ED Course   3:54 PM  The patient was seen and examined by Dr. Payton in Room [facility].   ED Course     Procedures               Assessments & Plan (with Medical Decision Making)   This patient presented to the Emergency Department after falling and sustaining a facial laceration more than 24 hours ago.  It had been Steri-Stripped at the scene and evaluation shows that it does appear to be well approximated and healing.  I am unable to spread the wound edges see with gentle traction and feel that suturing is not needed at this point.  Wound was reinforced with Dermabond and patient will follow up to see if his tetanus is up-to-date, he feels that it is up-to-date and most likely, being 22 years old it is within 10 years.  He was discharged in good condition.  He had no signs or symptoms that would suggest intracranial injury, facial bone fracture or cervical spine injury.    This part of the medical record was transcribed by Lyndsay Bell Medical Scribe, from a dictation done by Stephen Payton MD.     I have reviewed the nursing notes.    I have  reviewed the findings, diagnosis, plan and need for follow up with the patient.    Discharge Medication List as of 4/8/2018  4:43 PM          Final diagnoses:   Facial laceration, initial encounter   I, Lyndsay Bell, am serving as a trained medical scribe to document services personally performed by Conrad Payton MD, based on the provider's statements to me.   IConrad MD, was physically present and have reviewed and verified the accuracy of this note documented by Lyndsay Bell.    4/8/2018   [facility], EMERGENCY DEPARTMENT     Stephen Payton MD  04/09/18 7310

## 2018-04-08 NOTE — ED AVS SNAPSHOT
[facility], Emergency Department    [address] [facility] [address]    Phone:  [phone]                                       Kevin Agee   MRN: [id number]    Department:  [facility], Emergency Department   Date of Visit:  4/8/2018           Patient Information     Date Of Birth          1995        Your diagnoses for this visit were:     Facial laceration, initial encounter        You were seen by Stephen Payton MD.        Discharge Instructions       Please make an appointment to follow up with Your Primary Care Provider as needed.    Return to the emergency department for fevers, redness, swelling, pus from the wound or any other problems.      Discharge References/Attachments     LACERATION, FACE: SKIN GLUE (ENGLISH)      24 Hour Appointment Hotline       To make an appointment at any [facility] clinic, call [phone] ([phone]). If you don't have a family doctor or clinic, we will help you find one. [facility] clinics are conveniently located to serve the needs of you and your family.             Review of your medicines      Our records show that you are taking the medicines listed below. If these are incorrect, please call your family doctor or clinic.        Dose / Directions Last dose taken    clobetasol propionate 0.05 % Sham   Quantity:  118 mL        Apply to entire scalp then wash out three times per week   Refills:  3        MULTIVITAMIN/EXTRA VITAMIN D3 PO        Refills:  0                Orders Needing Specimen Collection     None      Pending Results     No orders found from 4/6/2018 to 4/9/2018.            Pending Culture Results     No orders found from 4/6/2018 to 4/9/2018.            Pending Results Instructions     If you had any lab results that were not finalized at the time of your Discharge, you can call the ED Lab Result RN at [phone]. You will be contacted by this team for any positive Lab results or changes in treatment. The nurses  are available 7 days a week from 10A to 6:30P.  You can leave a message 24 hours per day and they will return your call.        Thank you for choosing [facility]       Thank you for choosing [facility] for your care. Our goal is always to provide you with excellent care. Hearing back from our patients is one way we can continue to improve our services. Please take a few minutes to complete the written survey that you may receive in the mail after you visit with us. Thank you!        Active[employer]ES[employer]ODO Information     [employer] gives you secure access to your electronic health record. If you see a primary care provider, you can also send messages to your care team and make appointments. If you have questions, please call your primary care clinic.  If you do not have a primary care provider, please call [phone] and they will assist you.        Care EveryWhere ID     This is your Care EveryWhere ID. This could be used by other organizations to access your [facility] medical records  [id number]        Equal Access to Services     RIGO DUONG : Hadii elaine Sorensen, wapriyanka rivero, simon kaalmamoise rodriguez, cheng adkins . So [facility] [phone].    ATENCIÓN: Si habla español, tiene a rizvi disposición servicios gratuitos de asistencia lingüística. Lllindsay al [phone].    We comply with applicable federal civil rights laws and [place] laws. We do not discriminate on the basis of race, color, national origin, age, disability, sex, sexual orientation, or gender identity.            After Visit Summary       This is your record. Keep this with you and show to your community pharmacist(s) and doctor(s) at your next visit.

## 2018-04-08 NOTE — ED AVS SNAPSHOT
[facility], [place], Emergency Department    [address] [place] [address]    Phone:  [phone]                                       Kevin Agee   MRN: [id number]    Department:  [facility], Emergency Department   Date of Visit:  4/8/2018           After Visit Summary Signature Page     I have received my discharge instructions, and my questions have been answered. I have discussed any challenges I see with this plan with the nurse or doctor.    ..........................................................................................................................................  Patient/Patient Representative Signature      ..........................................................................................................................................  Patient Representative Print Name and Relationship to Patient    ..................................................               ................................................  Date                                            Time    ..........................................................................................................................................  Reviewed by Signature/Title    ...................................................              ..............................................  Date                                                            Time

## 2018-10-03 ENCOUNTER — OFFICE VISIT (OUTPATIENT)
Dept: DERMATOLOGY | Facility: CLINIC | Age: 23
End: 2018-10-03
Payer: COMMERCIAL

## 2018-10-03 DIAGNOSIS — L63.9 AA (ALOPECIA AREATA): ICD-10-CM

## 2018-10-03 RX ORDER — CLOBETASOL PROPIONATE 0.05 G/100ML
SHAMPOO TOPICAL
Qty: 118 ML | Refills: 3 | Status: SHIPPED | OUTPATIENT
Start: 2018-10-03 | End: 2019-08-16

## 2018-10-03 RX ORDER — CLOBETASOL PROPIONATE 0.05 G/100ML
SHAMPOO TOPICAL
Qty: 118 ML | Refills: 3 | Status: SHIPPED | OUTPATIENT
Start: 2018-10-03 | End: 2018-10-03

## 2018-10-03 ASSESSMENT — PAIN SCALES - GENERAL
PAINLEVEL: NO PAIN (0)
PAINLEVEL: MILD PAIN (2)

## 2018-10-03 NOTE — NURSING NOTE
Dermatology Rooming Note    Kevin Agee's goals for this visit include:   Chief Complaint   Patient presents with     Derm Problem     A A followup, Kevin notes a new bald patch but notes his hair is doing well overall.      Brittany Collins LPN

## 2018-10-03 NOTE — MR AVS SNAPSHOT
After Visit Summary   10/3/2018    Kevin Agee    MRN: [id number]           Patient Information     Date Of Birth          1995        Visit Information        Provider Department      10/3/2018 5:15 PM Kaylah Schulz PA-C [facility] Dermatology        Today's Diagnoses     AA (alopecia areata)           Follow-ups after your visit        Follow-up notes from your care team     Return in about 4 weeks (around 10/31/2018).      Your next 10 appointments already scheduled     Nov 07, 2018  4:00 PM CST   (Arrive by 3:45 PM)   Return Visit with MANNY V[facility] Dermatology ([facility])    [address] [address] [phone]              Who to contact     Please call your clinic at [phone] to:    Ask questions about your health    Make or cancel appointments    Discuss your medicines    Learn about your test results    Speak to your doctor            Additional Information About Your Visit        MyCharDodonation Information     [employer] gives you secure access to your electronic health record. If you see a primary care provider, you can also send messages to your care team and make appointments. If you have questions, please call your primary care clinic.  If you do not have a primary care provider, please call [phone] and they will assist you.      Ark[employer] is an electronic gateway that provides easy, online access to your medical records. With [employer], you can request a clinic appointment, read your test results, renew a prescription or communicate with your care team.     To access your existing account, please contact your [facility] or call [phone] for assistance.        Care EveryWhere ID     This is your Care EveryWhere ID. This could be used by other organizations to access your [facility] medical records  [id number]         Blood Pressure from Last 3  Encounters:   04/08/18 138/68   04/05/17 139/76   11/20/16 116/65    Weight from Last 3 Encounters:   04/08/18 88 kg (194 lb 0.1 oz)   04/05/17 81.6 kg (180 lb) (82 %)*     * Growth percentiles are based on [facility] 2-20 Years data.              We Performed the Following     INJECTION INTO SKIN LESIONS >7          Today's Medication Changes          These changes are accurate as of 10/3/18  6:08 PM.  If you have any questions, ask your nurse or doctor.               Start taking these medicines.        Dose/Directions    clobetasol propionate 0.05 % Sham   Used for:  AA (alopecia areata)        Apply to entire dry scalp then wash out three times per week   Quantity:  118 mL   Refills:  3       triamcinolone acetonide 10 MG/ML injection   Commonly known as:  KENALOG   Used for:  AA (alopecia areata)        Dose:  10 mg   Inject 1 mL (10 mg) into the skin once for 1 dose   Quantity:  1 mL   Refills:  0            Where to get your medicines      These medications were sent to [facility] [address] [place] [address] [address] [facility] [address] [place] [address] [address] [facility] [address]     Phone:  [phone]     clobetasol propionate 0.05 % Sham         Some of these will need a paper prescription and others can be bought over the counter.  Ask your nurse if you have questions.     You don't need a prescription for these medications     triamcinolone acetonide 10 MG/ML injection                Primary Care Provider Fax #    Physician No Ref-Primary [phone]       No address on file        Equal Access to Services     RIGO DUONG : Brandon snider Sobrittany, waaxda luqadaha, qaybta kaalmada adeegyada, cheng idivincenzo arias. So [facility] [phone].    ATENCIÓN: Si habla español, tiene a rizvi disposición servicios gratuitos de asistencia lingüística. Llame al [phone].    We comply with applicable federal civil rights laws and [place] laws. We do not  discriminate on the basis of race, color, national origin, age, disability, sex, sexual orientation, or gender identity.            Thank you!     Thank you for choosing [facility] DERMATOLOGY  for your care. Our goal is always to provide you with excellent care. Hearing back from our patients is one way we can continue to improve our services. Please take a few minutes to complete the written survey that you may receive in the mail after your visit with us. Thank you!             Your Updated Medication List - Protect others around you: Learn how to safely use, store and throw away your medicines at www.disposemymeds.org.          This list is accurate as of 10/3/18  6:08 PM.  Always use your most recent med list.                   Brand Name Dispense Instructions for use Diagnosis    B-COMPLEX PO           clobetasol propionate 0.05 % Sham     118 mL    Apply to entire dry scalp then wash out three times per week    AA (alopecia areata)       KRILL OIL PO           MAGNESIUM OXIDE PO           MULTIVITAMIN/EXTRA VITAMIN D3 PO           triamcinolone acetonide 10 MG/ML injection    KENALOG    1 mL    Inject 1 mL (10 mg) into the skin once for 1 dose    AA (alopecia areata)

## 2018-10-03 NOTE — LETTER
10/3/2018       RE: Kevin Agee  [address]  [place] [address]     Dear Colleague,    Thank you for referring your patient, Kevin Agee, to the [facility]ERMAT[facility] at [facility]. Please see a copy of my visit note below.    [facility] Dermatology Note    Dermatology Problem List:  1. Alopecia Areata  -Clobetasol shampoo three times per week, ILK-10    Encounter Date: Oct 3, 2018    CC:   Chief Complaint   Patient presents with     Derm Problem     A A followup, Kevin notes a new bald patch but notes his hair is doing well overall.        History of Present Illness:  This 22 year old male presents for an alopecia areata follow up. The patient was last seen in the dermatology clinic on 4/26/17 during which he received ILK-10 injections regarding his alopecia areata.     Today he reports that he has a new bald patch on the back of his scalp, but overall his scalp and hair are doing well. The new area of alopecia appeared about a month ago. He was using the clobetasol shampoo since last visit with improvement on his scalp but ran out last summer and has not used it since. He would like a refill today.     Otherwise the patient reports no additional painful, bleeding, nonhealing or pruritic lesions and denies any new or changing moles.    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     Family History:  No family history on file.       Medications:  Current Outpatient Prescriptions   Medication Sig Dispense Refill     B Complex-Biotin-FA (B-COMPLEX PO)        KRILL OIL PO        MAGNESIUM OXIDE PO        Multiple Vitamins-Minerals (MULTIVITAMIN/EXTRA VITAMIN D3 PO)        clobetasol  propionate 0.05 % SHAM Apply to entire scalp then wash out three times per week (Patient not taking: Reported on 10/3/2018) 118 mL 3     Allergies:  No Known Allergies  Review of Systems:  Skin: As per HPI. No additional skin concerns.  Constitutional: The patient has been feeling generally well     Physical exam:  There were no vitals taken for this visit.  - This is a well developed, well-nourished male in no acute distress, in a pleasant mood.    - Lynn Skin Type IV  - SKIN: A focused examination of the scalp and face was performed. The examination significant for:    - There is a 3 cm x 2 cm patch of alopecia with fine vellus fibers noted centrally.   - Loss of terminal hairs on the central posterior scalp   - Eyebrows and eyelashes wnl  - No other concerning features noted     Impression/Plan:  1. Alopecia Areata: Discussed that this is a disease where the body's immune system attacks the hair follicles and that it can be associated with other autoimmune diseases (most commonly thyroid disease)  Restart clobetasol 0.05% shampoo, applying to entire scalp then rinse. Repeat 3 times per week. Refill provided at today's visit. Pt instructed to wait at least 1 week after ILK injections to begin use.  Kenalog intralesional injection procedure note: After verbal consent and discussion of risks including but not limited to atrophy, pain, and bruising, time out was performed, the patient underwent positioning and the area was prepped with isopropyl alcohol, 0.8 total cc of Kenalog 10 mg/cc was injected into 8 lesion(s) on the central posterior scalp.  The patient tolerated the procedure well and left the Dermatology clinic in good condition.    Follow up in 4-5 weeks, earlier for new or worsening hair loss     Staff Involved:  Scribe/Staff    Scribe Disclosure:   Kathryn JUAN, am serving as a scribe to document services personally performed by Kaylah Schulz PA-C, based on data collection and the provider's  statements to me.    Provider Disclosure:   The documentation recorded by the scribe accurately reflects the services I personally performed and the decisions made by me.    All risks, benefits and alternatives were discussed with patient.  Patient is in agreement and understands the assessment and plan.  All questions were answered.  Sun Screen Education was given.   Return to Clinic in 4-5 weeks or sooner as needed.     Kaylah Schulz PA-C   [facility]

## 2018-10-03 NOTE — PROGRESS NOTES
[facility] Dermatology Note    Dermatology Problem List:  1. Alopecia Areata  -Clobetasol shampoo three times per week, ILK-10    Encounter Date: Oct 3, 2018    CC:   Chief Complaint   Patient presents with     Derm Problem     A A followup, Kevin notes a new bald patch but notes his hair is doing well overall.        History of Present Illness:  This 22 year old male presents for an alopecia areata follow up. The patient was last seen in the dermatology clinic on 4/26/17 during which he received ILK-10 injections regarding his alopecia areata.     Today he reports that he has a new bald patch on the back of his scalp, but overall his scalp and hair are doing well. The new area of alopecia appeared about a month ago. He was using the clobetasol shampoo since last visit with improvement on his scalp but ran out last summer and has not used it since. He would like a refill today.     Otherwise the patient reports no additional painful, bleeding, nonhealing or pruritic lesions and denies any new or changing moles.    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     Family History:  No family history on file.       Medications:  Current Outpatient Prescriptions   Medication Sig Dispense Refill     B Complex-Biotin-FA (B-COMPLEX PO)        KRILL OIL PO        MAGNESIUM OXIDE PO        Multiple Vitamins-Minerals (MULTIVITAMIN/EXTRA VITAMIN D3 PO)        clobetasol propionate 0.05 % SHAM Apply to entire scalp then wash out three times per week (Patient not taking: Reported on 10/3/2018) 118 mL 3     Allergies:  No Known Allergies  Review of Systems:  Skin: As per HPI. No additional skin concerns.  Constitutional: The patient has been feeling  generally well     Physical exam:  There were no vitals taken for this visit.  - This is a well developed, well-nourished male in no acute distress, in a pleasant mood.    - Lynn Skin Type IV  - SKIN: A focused examination of the scalp and face was performed. The examination significant for:    - There is a 3 cm x 2 cm patch of alopecia with fine vellus fibers noted centrally.   - Loss of terminal hairs on the central posterior scalp   - Eyebrows and eyelashes wnl  - No other concerning features noted     Impression/Plan:  1. Alopecia Areata: Discussed that this is a disease where the body's immune system attacks the hair follicles and that it can be associated with other autoimmune diseases (most commonly thyroid disease)  Restart clobetasol 0.05% shampoo, applying to entire scalp then rinse. Repeat 3 times per week. Refill provided at today's visit. Pt instructed to wait at least 1 week after ILK injections to begin use.  Kenalog intralesional injection procedure note: After verbal consent and discussion of risks including but not limited to atrophy, pain, and bruising, time out was performed, the patient underwent positioning and the area was prepped with isopropyl alcohol, 0.8 total cc of Kenalog 10 mg/cc was injected into 8 lesion(s) on the central posterior scalp.  The patient tolerated the procedure well and left the Dermatology clinic in good condition.    Follow up in 4-5 weeks, earlier for new or worsening hair loss     Staff Involved:  Scribe/Staff    Scribe Disclosure:   YESSICA, Kathryn Crawford, am serving as a scribe to document services personally performed by Kaylah Schulz PA-C, based on data collection and the provider's statements to me.    Provider Disclosure:   The documentation recorded by the scribe accurately reflects the services I personally performed and the decisions made by me.    All risks, benefits and alternatives were discussed with patient.  Patient is in agreement and understands the  assessment and plan.  All questions were answered.  Sun Screen Education was given.   Return to Clinic in 4-5 weeks or sooner as needed.   Kaylah Schulz PA-C   [facility]

## 2018-10-03 NOTE — NURSING NOTE
The following medication was given:     MEDICATION:  Kenalog 10 mg  ROUTE: ID  SITE: See provider note   DOSE: 50mg per 5mL  LOT #: [id number]  : [employer]  EXPIRATION DATE: 02/20  NDC#: 4253-2637-52   Was there drug waste? Yes  Amount of drug waste (mL): 4.  Reason for waste:  Single use vial  Multi-dose vial: Yes, use as single use    Catina Beyer CMA  October 3, 2018

## 2018-11-07 ENCOUNTER — OFFICE VISIT (OUTPATIENT)
Dept: DERMATOLOGY | Facility: CLINIC | Age: 23
End: 2018-11-07
Payer: COMMERCIAL

## 2018-11-07 DIAGNOSIS — L28.0 LICHEN SIMPLEX CHRONICUS: ICD-10-CM

## 2018-11-07 DIAGNOSIS — L63.9 AA (ALOPECIA AREATA): Primary | ICD-10-CM

## 2018-11-07 ASSESSMENT — PAIN SCALES - GENERAL: PAINLEVEL: NO PAIN (1)

## 2018-11-07 NOTE — PROGRESS NOTES
[facility] Dermatology Note    Dermatology Problem List:  1. Alopecia Areata  -Clobetasol shampoo three times per week, ILK-10  2. Prurigo nodule, central posterior scalp   - s/p ILK-10    Encounter Date: Nov 7, 2018    CC:   Chief Complaint   Patient presents with     Derm Problem     Kevin is here for a hair loss follow up, he notes his area of loss has not improved.        History of Present Illness:  This 22 year old male presents for an alopecia areata follow up. The patient was last seen in the dermatology clinic on 10/03/18 during which 0.8 ml of ILK-10 were injected into 8 lesions on the posterior scalp regarding his alopecia areata.     Today he reports that his hair loss is about the same. He thinks the spot that was injected last time may have gotten smaller, but he is not sure- as the area is hard for him to see. He also notes a bump on the right side of his scalp. He often finds that he picks at this area. He is unsure the nature of this spot and would like reassurance. The spot is annoying to him as it is tender to the touch.     Otherwise the patient reports no additional painful, bleeding, nonhealing or pruritic lesions and denies any new or changing moles.    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     Family History:  No family history on file.       Medications:  Current Outpatient Prescriptions   Medication Sig Dispense Refill     B Complex-Biotin-FA (B-COMPLEX PO)        clobetasol propionate 0.05 % SHAM Apply to entire dry scalp then wash out three times per week 118 mL 3     KRILL OIL PO        MAGNESIUM OXIDE PO        Multiple Vitamins-Minerals (MULTIVITAMIN/EXTRA VITAMIN D3 PO)        Allergies:  No Known  Allergies  Review of Systems:  Skin: As per HPI. No additional skin concerns.  Constitutional: The patient has been feeling generally well     Physical exam:  There were no vitals taken for this visit.  - This is a well developed, well-nourished male in no acute distress, in a pleasant mood.    - Lynn Skin Type IV  - SKIN: A focused examination of the scalp and face was performed. The examination significant for:    - There is a 3 cm x 2.5 cm patch of alopecia with terminal hairs noted centrally.   - Eyebrows and eyelashes wnl  - lichenified 4-5 mm papule on the central posterior scalp   - No other concerning features noted     Impression/Plan:  1. Alopecia Areata: Reminded that this is a disease where the body's immune system attacks the hair follicles and that it can be associated with other autoimmune diseases (most commonly thyroid disease)  Continue clobetasol 0.05% shampoo, applying to entire scalp then rinse. Repeat 3 times per week. Refill provided at today's visit. Pt instructed to wait at least 1 week after ILK injections to begin use.  Kenalog intralesional injection procedure note: After verbal consent and discussion of risks including but not limited to atrophy, pain, and bruising, time out was performed, the patient underwent positioning and the area was prepped with isopropyl alcohol, 0.8 total cc of Kenalog 10 mg/cc was injected into 8 lesions on the central posterior scalp.  The patient tolerated the procedure well and left the Dermatology clinic in good condition.    2. Prurigo nodule, central posterior scalp     Kenalog intralesional injection procedure note: After verbal consent and discussion of risks including but not limited to atrophy, pain, and bruising, time out was performed, the patient underwent positioning, 0.05 total cc of Kenalog 10 mg/cc was injected into 1 site on the central posterior scalp.  The patient tolerated the procedure well and left the Dermatology clinic in good  condition.      Follow up in 4-5 weeks, earlier for new or worsening hair loss     Staff Involved:  Scribe/Staff    Scribe Disclosure:   I, Kathryn Crawford, am serving as a scribe to document services personally performed by Kaylah Schulz PA-C, based on data collection and the provider's statements to me.    Provider Disclosure:   The documentation recorded by the scribe accurately reflects the services I personally performed and the decisions made by me.    All risks, benefits and alternatives were discussed with patient.  Patient is in agreement and understands the assessment and plan.  All questions were answered.  Sun Screen Education was given.   Return to Clinic in 4-5 weeks or sooner as needed.   Kaylah Schulz PA-C   [facility]at[facility]

## 2018-11-07 NOTE — MR AVS SNAPSHOT
After Visit Summary   11/7/2018    Kevin Agee    MRN: [id number]           Patient Information     Date Of Birth          1995        Visit Information        Provider Department      11/7/2018 4:00 PM Kaylah Schulz PA-C [facility] D[facility]at[facility]        Today's Diagnoses     AA (alopecia areata)    -  1    Lichen simplex chronicus           Follow-ups after your visit        Follow-up notes from your care team     Return in about 4 weeks (around 12/5/2018).      Your next 10 appointments already scheduled     Dec 05, 2018  3:15 PM CST   (Arrive by 3:00 PM)   Return Visit with MANNY Vital [facility] ([facility])    [address] [address] [phone]              Who to contact     Please call your clinic at [phone] to:    Ask questions about your health    Make or cancel appointments    Discuss your medicines    Learn about your test results    Speak to your doctor            Additional Information About Your Visit        MyChart Information     [employer] gives you secure access to your electronic health record. If you see a primary care provider, you can also send messages to your care team and make appointments. If you have questions, please call your primary care clinic.  If you do not have a primary care provider, please call [phone] and they will assist you.      [employer] is an electronic gateway that provides easy, online access to your medical records. With [employer], you can request a clinic appointment, read your test results, renew a prescription or communicate with your care team.     To access your existing account, please contact your [facility] or call [phone] for assistance.        Care EveryWhere ID     This is your Care EveryWhere ID. This could be used by other organizations to access your [place] medical records  [id number]          Blood Pressure from Last 3 Encounters:   04/08/18 138/68   04/05/17 139/76   11/20/16 116/65    Weight from Last 3 Encounters:   04/08/18 88 kg (194 lb 0.1 oz)   04/05/17 81.6 kg (180 lb) (82 %)*     * Growth percentiles are based on [facility] 2-20 Years data.              We Performed the Following     INJECTION INTO SKIN LESIONS >7          Today's Medication Changes          These changes are accurate as of 11/7/18  4:08 PM.  If you have any questions, ask your nurse or doctor.               Start taking these medicines.        Dose/Directions    triamcinolone acetonide 10 MG/ML injection   Commonly known as:  KENALOG   Used for:  AA (alopecia areata), Lichen simplex chronicus        Dose:  10 mg   Inject 1 mL (10 mg) into the skin once for 1 dose   Quantity:  1 mL   Refills:  0            Where to get your medicines      Some of these will need a paper prescription and others can be bought over the counter.  Ask your nurse if you have questions.     You don't need a prescription for these medications     triamcinolone acetonide 10 MG/ML injection                Primary Care Provider Fax #    Physician No Ref-Primary [phone]       No address on file        Equal Access to Services     [facility] Hospital[facility]AT[facility] : Hadii elaine Sorensen, wapriyanka irvero, qaybta kaalmada jennifer, cheng arias. So [facility] [phone].    ATENCIÓN: Si habla español, tiene a rizvi disposición servicios gratuitos de asistencia lingüística. Llame al [phone].    We comply with applicable federal civil rights laws and [place] laws. We do not discriminate on the basis of race, color, national origin, age, disability, sex, sexual orientation, or gender identity.            Thank you!     Thank you for choosing [facility] DERMATOLOGY  for your care. Our goal is always to provide you with excellent care. Hearing back from our patients is one way we can continue to improve our services. Please take a few minutes to  complete the written survey that you may receive in the mail after your visit with us. Thank you!             Your Updated Medication List - Protect others around you: Learn how to safely use, store and throw away your medicines at www.disposemymeds.org.          This list is accurate as of 11/7/18  4:08 PM.  Always use your most recent med list.                   Brand Name Dispense Instructions for use Diagnosis    B-COMPLEX PO           clobetasol propionate 0.05 % Sham     118 mL    Apply to entire dry scalp then wash out three times per week    AA (alopecia areata)       KRILL OIL PO           MAGNESIUM OXIDE PO           MULTIVITAMIN/EXTRA VITAMIN D3 PO           triamcinolone acetonide 10 MG/ML injection    KENALOG    1 mL    Inject 1 mL (10 mg) into the skin once for 1 dose    AA (alopecia areata), Lichen simplex chronicus

## 2018-11-07 NOTE — NURSING NOTE
Drug Administration Record    Drug Name: triamcinolone acetonide(kenalog)  Dose: 0.85mL of triamcinolone 10mg/mL,8.5 mg dose  Route administered: ID  NDC #: Kenalog-10 (8951-2128-36)  Amount of waste(mL):4.15  Reason for waste: Single use vial    LOT #: [id number]  SITE: [facility]  : [employer]  EXPIRATION DATE: 2/2020

## 2018-11-07 NOTE — LETTER
11/7/2018       RE: Kevin Aege  [address]  [place] [address]     Dear Colleague,    Thank you for referring your patient, Kevin Agee, to the [facility]ER[facility]OLOG[facility] at [facility]. Please see a copy of my visit note below.    [facility] Dermatology Note    Dermatology Problem List:  1. Alopecia Areata  -Clobetasol shampoo three times per week, ILK-10  2. Prurigo nodule, central posterior scalp   - s/p ILK-10    Encounter Date: Nov 7, 2018    CC:   Chief Complaint   Patient presents with     Derm Problem     Kevin is here for a hair loss follow up, he notes his area of loss has not improved.        History of Present Illness:  This 22 year old male presents for an alopecia areata follow up. The patient was last seen in the dermatology clinic on 10/03/18 during which 0.8 ml of ILK-10 were injected into 8 lesions on the posterior scalp regarding his alopecia areata.     Today he reports that his hair loss is about the same. He thinks the spot that was injected last time may have gotten smaller, but he is not sure- as the area is hard for him to see. He also notes a bump on the right side of his scalp. He often finds that he picks at this area. He is unsure the nature of this spot and would like reassurance. The spot is annoying to him as it is tender to the touch.     Otherwise the patient reports no additional painful, bleeding, nonhealing or pruritic lesions and denies any new or changing moles.    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     Family History:  No family history on file.       Medications:  Current Outpatient Prescriptions   Medication Sig Dispense Refill     B  Complex-Biotin-FA (B-COMPLEX PO)        clobetasol propionate 0.05 % SHAM Apply to entire dry scalp then wash out three times per week 118 mL 3     KRILL OIL PO        MAGNESIUM OXIDE PO        Multiple Vitamins-Minerals (MULTIVITAMIN/EXTRA VITAMIN D3 PO)        Allergies:  No Known Allergies  Review of Systems:  Skin: As per HPI. No additional skin concerns.  Constitutional: The patient has been feeling generally well     Physical exam:  There were no vitals taken for this visit.  - This is a well developed, well-nourished male in no acute distress, in a pleasant mood.    - Lynn Skin Type IV  - SKIN: A focused examination of the scalp and face was performed. The examination significant for:    - There is a 3 cm x 2.5 cm patch of alopecia with terminal hairs noted centrally.   - Eyebrows and eyelashes wnl  - lichenified 4-5 mm papule on the central posterior scalp   - No other concerning features noted     Impression/Plan:  1. Alopecia Areata: Reminded that this is a disease where the body's immune system attacks the hair follicles and that it can be associated with other autoimmune diseases (most commonly thyroid disease)  Continue clobetasol 0.05% shampoo, applying to entire scalp then rinse. Repeat 3 times per week. Refill provided at today's visit. Pt instructed to wait at least 1 week after ILK injections to begin use.  Kenalog intralesional injection procedure note: After verbal consent and discussion of risks including but not limited to atrophy, pain, and bruising, time out was performed, the patient underwent positioning and the area was prepped with isopropyl alcohol, 0.8 total cc of Kenalog 10 mg/cc was injected into 8 lesions on the central posterior scalp.  The patient tolerated the procedure well and left the Dermatology clinic in good condition.    2. Prurigo nodule, central posterior scalp     Kenalog intralesional injection procedure note: After verbal consent and discussion of risks  including but not limited to atrophy, pain, and bruising, time out was performed, the patient underwent positioning, 0.05 total cc of Kenalog 10 mg/cc was injected into 1 site on the central posterior scalp.  The patient tolerated the procedure well and left the Dermatology clinic in good condition.      Follow up in 4-5 weeks, earlier for new or worsening hair loss     Staff Involved:  Scribe/Staff    Scribe Disclosure:   I, Kathryn Crawford, am serving as a scribe to document services personally performed by Kaylah Schulz PA-C, based on data collection and the provider's statements to me.    Provider Disclosure:   The documentation recorded by the scribe accurately reflects the services I personally performed and the decisions made by me.    All risks, benefits and alternatives were discussed with patient.  Patient is in agreement and understands the assessment and plan.  All questions were answered.  Sun Screen Education was given.   Return to Clinic in 4-5 weeks or sooner as needed.   Kaylah Schulz PA-C   [facility]         Again, thank you for allowing me to participate in the care of your patient.      Sincerely,    Kaylah Schulz PA-C

## 2018-12-05 ENCOUNTER — OFFICE VISIT (OUTPATIENT)
Dept: DERMATOLOGY | Facility: CLINIC | Age: 23
End: 2018-12-05
Payer: COMMERCIAL

## 2018-12-05 DIAGNOSIS — L63.9 AA (ALOPECIA AREATA): Primary | ICD-10-CM

## 2018-12-05 ASSESSMENT — PAIN SCALES - GENERAL
PAINLEVEL: NO PAIN (1)
PAINLEVEL: NO PAIN (0)

## 2018-12-05 NOTE — LETTER
12/5/2018       RE: Kevin Agee  [address]  [place] [address]     Dear Colleague,    Thank you for referring your patient, Kevin Agee, to the [facility] DERMATOLOGY at [facility]. Please see a copy of my visit note below.    [facility] Dermatology Note    Dermatology Problem List:  1. Alopecia Areata  -Clobetasol shampoo three times per week, ILK-10, 11/7/18  2. Prurigo nodule, central posterior scalp   - s/p ILK-10, 11/7/18    Encounter Date: Dec 5, 2018    CC:   Chief Complaint   Patient presents with     Derm Problem     Hairloss follow up, Kevin notes hair growth.       History of Present Illness:  This 22 year old male presents for an alopecia areata follow up. The patient was last seen in the dermatology clinic on 11/07/18 during which 0.8 ml of ILK-10 was injected into 8 areas on the central posterior scalp regarding his alopecia areata and 0.05 ml of ILK-10 was injected into 1 prurigo nodule on his central posterior scalp.    Today he reports that his scalp is doing well. He notes some hair growth since his last visit.     Otherwise the patient reports no additional painful, bleeding, nonhealing or pruritic lesions and denies any new or changing moles.    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     Family History:  No family history on file.       Medications:  Current Outpatient Prescriptions   Medication Sig Dispense Refill     B Complex-Biotin-FA (B-COMPLEX PO)        clobetasol propionate 0.05 % SHAM Apply to entire dry scalp then wash out three times per week 118 mL 3     KRILL OIL PO        MAGNESIUM OXIDE PO        Multiple Vitamins-Minerals (MULTIVITAMIN/EXTRA  VITAMIN D3 PO)        Allergies:  No Known Allergies  Review of Systems:  Skin: As per HPI. No additional skin concerns.  Constitutional: The patient has been feeling generally well     Physical exam:  There were no vitals taken for this visit.  - This is a well developed, well-nourished male in no acute distress, in a pleasant mood.    - Lynn Skin Type IV  - SKIN: A focused examination of the scalp and face was performed. The examination significant for:    - Significant regrowth noted to alopecia patch, at periphery there is slight perifollicular attentuation  - Eyebrows and eyelashes wnl  - No evidence of lichenified papule to the central posterior scalp   - No other concerning features noted     Impression/Plan:  1. Alopecia Areata: Improvement s/p ILK on 11/7/18  Continue clobetasol 0.05% shampoo, applying to entire scalp then rinse. Repeat 3 times per week. Refill provided at today's visit. Pt instructed to wait at least 1 week after ILK injections to begin use.  Kenalog intralesional injection procedure note: After verbal consent and discussion of risks including but not limited to atrophy, pain, and bruising, time out was performed, the patient underwent positioning and the area was prepped with isopropyl alcohol, 0.4 total cc of Kenalog 10 mg/cc was injected into 3 lesions on the central posterior scalp.  The patient tolerated the procedure well and left the Dermatology clinic in good condition.    2. Prurigo nodule, central posterior scalp, resolved- s/p ILK       Follow up in 1 year, earlier for new or worsening hair loss     Staff Involved:  Scribe/Staff    Scribe Disclosure:   I, Kathryn Crawford, am serving as a scribe to document services personally performed by Kaylah Schulz PA-C, based on data collection and the provider's statements to me.    Provider Disclosure:   The documentation recorded by the scribe accurately reflects the services I personally performed and the decisions made by me.     All risks, benefits and alternatives were discussed with patient.  Patient is in agreement and understands the assessment and plan.  All questions were answered.  Sun Screen Education was given.   Return to Clinic annually, unless flaring or sooner as needed.   Kaylah Schulz PA-C   [facility]     Again, thank you for allowing me to participate in the care of your patient.      Sincerely,    Kaylah Schulz PA-C

## 2018-12-05 NOTE — MR AVS SNAPSHOT
After Visit Summary   12/5/2018    Kevin Agee    MRN: [id number]           Patient Information     Date Of Birth          1995        Visit Information        Provider Department      12/5/2018 3:15 PM Kaylah Schulz PA-C [facility]        Today's Diagnoses     AA (alopecia areata)    -  1      Care Instructions    Drug Administration Record    Drug Name: triamcinolone acetonide(kenalog)  Dose: 0.4 mL of triamcinolone 10mg/mL,4mg dose  Route administered: ID  NDC #: Kenalog-10 (2707-3553-23)  Amount of waste(mL):4.6  Reason for waste: Multi dose vial    LOT #: [id number]  SITE: scalp   : [employer]  EXPIRATION DATE: 05/2020          Follow-ups after your visit        Follow-up notes from your care team     Return in about 1 year (around 12/5/2019).      Who to contact     Please call your clinic at [phone] to:    Ask questions about your health    Make or cancel appointments    Discuss your medicines    Learn about your test results    Speak to your doctor            Additional Information About Your Visit        MyChart Information     [employer]mit gives you secure access to your electronic health record. If you see a primary care provider, you can also send messages to your care team and make appointments. If you have questions, please call your primary care clinic.  If you do not have a primary care provider, please call [phone] and they will assist you.      [employer]mit is an electronic gateway that provides easy, online access to your medical records. With [employer]mit, you can request a clinic appointment, read your test results, renew a prescription or communicate with your care team.     To access your existing account, please contact your [facility] or call [phone] for assistance.        Care EveryWhere ID     This is your Care EveryWhere ID. This could be used by other organizations to access your [place]  medical records  [id number]         Blood Pressure from Last 3 Encounters:   04/08/18 138/68   04/05/17 139/76   11/20/16 116/65    Weight from Last 3 Encounters:   04/08/18 88 kg (194 lb 0.1 oz)   04/05/17 81.6 kg (180 lb) (82 %)*     * Growth percentiles are based on [facility] 2-20 Years data.              We Performed the Following     INJECTION INTO SKIN LESIONS <=7          Today's Medication Changes          These changes are accurate as of 12/5/18  3:52 PM.  If you have any questions, ask your nurse or doctor.               Start taking these medicines.        Dose/Directions    triamcinolone acetonide 10 MG/ML injection   Commonly known as:  KENALOG   Used for:  AA (alopecia areata)   Started by:  Kaylah Schulz PA-C        Dose:  10 mg   Inject 1 mL (10 mg) into the skin once for 1 dose   Quantity:  1 mL   Refills:  0            Where to get your medicines      Some of these will need a paper prescription and others can be bought over the counter.  Ask your nurse if you have questions.     You don't need a prescription for these medications     triamcinolone acetonide 10 MG/ML injection                Primary Care Provider Fax #    Physician No Ref-Primary [phone]       No address on file        Equal Access to Services     RIGO DUONG : Brandon ulricho Sobrittany, waaxda luqadaha, qaybta kaalmada adeegyada, cheng arias. So [facility] [phone].    ATENCIÓN: Si habla español, tiene a rizvi disposición servicios gratuitos de asistencia lingüística. Llame al [phone].    We comply with applicable federal civil rights laws and [place] laws. We do not discriminate on the basis of race, color, national origin, age, disability, sex, sexual orientation, or gender identity.            Thank you!     Thank you for choosing [facility] DERMATOLOGY  for your care. Our goal is always to provide you with excellent care. Hearing back from our patients is one way we can continue to  improve our services. Please take a few minutes to complete the written survey that you may receive in the mail after your visit with us. Thank you!             Your Updated Medication List - Protect others around you: Learn how to safely use, store and throw away your medicines at www.disposemymeds.org.          This list is accurate as of 12/5/18  3:52 PM.  Always use your most recent med list.                   Brand Name Dispense Instructions for use Diagnosis    B-COMPLEX PO           clobetasol propionate 0.05 % external shampoo    CLOBEX    118 mL    Apply to entire dry scalp then wash out three times per week    AA (alopecia areata)       KRILL OIL PO           MAGNESIUM OXIDE PO           MULTIVITAMIN/EXTRA VITAMIN D3 PO           triamcinolone acetonide 10 MG/ML injection    KENALOG    1 mL    Inject 1 mL (10 mg) into the skin once for 1 dose    AA (alopecia areata)

## 2018-12-05 NOTE — NURSING NOTE
Dermatology Rooming Note    Kevin Agee's goals for this visit include:   Chief Complaint   Patient presents with     Derm Problem     Hairloss follow up, Kevin notes hair growth.     Brittany Collins LPN

## 2018-12-05 NOTE — PATIENT INSTRUCTIONS
Drug Administration Record    Drug Name: triamcinolone acetonide(kenalog)  Dose: 0.4 mL of triamcinolone 10mg/mL,4mg dose  Route administered: ID  NDC #: Kenalog-10 (2812-3042-80)  Amount of waste(mL):4.6  Reason for waste: Multi dose vial    LOT #: [id number]  SITE: [facility]   : [employer]  EXPIRATION DATE: 05/2020

## 2018-12-05 NOTE — PROGRESS NOTES
[facility] Dermatology Note    Dermatology Problem List:  1. Alopecia Areata  -Clobetasol shampoo three times per week, ILK-10, 11/7/18  2. Prurigo nodule, central posterior scalp   - s/p ILK-10, 11/7/18    Encounter Date: Dec 5, 2018    CC:   Chief Complaint   Patient presents with     Derm Problem     Hairloss follow up, Kevin notes hair growth.       History of Present Illness:  This 22 year old male presents for an alopecia areata follow up. The patient was last seen in the dermatology clinic on 11/07/18 during which 0.8 ml of ILK-10 was injected into 8 areas on the central posterior scalp regarding his alopecia areata and 0.05 ml of ILK-10 was injected into 1 prurigo nodule on his central posterior scalp.    Today he reports that his scalp is doing well. He notes some hair growth since his last visit.     Otherwise the patient reports no additional painful, bleeding, nonhealing or pruritic lesions and denies any new or changing moles.    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     Family History:  No family history on file.       Medications:  Current Outpatient Prescriptions   Medication Sig Dispense Refill     B Complex-Biotin-FA (B-COMPLEX PO)        clobetasol propionate 0.05 % SHAM Apply to entire dry scalp then wash out three times per week 118 mL 3     KRILL OIL PO        MAGNESIUM OXIDE PO        Multiple Vitamins-Minerals (MULTIVITAMIN/EXTRA VITAMIN D3 PO)        Allergies:  No Known Allergies  Review of Systems:  Skin: As per HPI. No additional skin concerns.  Constitutional: The patient has been feeling generally well     Physical exam:  There were no vitals taken for this visit.  - This is a well developed,  well-nourished male in no acute distress, in a pleasant mood.    - Lynn Skin Type IV  - SKIN: A focused examination of the scalp and face was performed. The examination significant for:    - Significant regrowth noted to alopecia patch, at periphery there is slight perifollicular attentuation  - Eyebrows and eyelashes wnl  - No evidence of lichenified papule to the central posterior scalp   - No other concerning features noted     Impression/Plan:  1. Alopecia Areata: Improvement s/p ILK on 11/7/18  Continue clobetasol 0.05% shampoo, applying to entire scalp then rinse. Repeat 3 times per week. Refill provided at today's visit. Pt instructed to wait at least 1 week after ILK injections to begin use.  Kenalog intralesional injection procedure note: After verbal consent and discussion of risks including but not limited to atrophy, pain, and bruising, time out was performed, the patient underwent positioning and the area was prepped with isopropyl alcohol, 0.4 total cc of Kenalog 10 mg/cc was injected into 3 lesions on the central posterior scalp.  The patient tolerated the procedure well and left the Dermatology clinic in good condition.    2. Prurigo nodule, central posterior scalp, resolved- s/p ILK       Follow up in 1 year, earlier for new or worsening hair loss     Staff Involved:  Scribe/Staff    Scribe Disclosure:   YESSICA, Kathryn Crawford, am serving as a scribe to document services personally performed by Kaylah Schulz PA-C, based on data collection and the provider's statements to me.    Provider Disclosure:   The documentation recorded by the scribe accurately reflects the services I personally performed and the decisions made by me.    All risks, benefits and alternatives were discussed with patient.  Patient is in agreement and understands the assessment and plan.  All questions were answered.  Sun Screen Education was given.   Return to Clinic annually, unless flaring or sooner as needed.   Kaylah  Jazz BRYANT   [facility]

## 2019-08-16 ENCOUNTER — OFFICE VISIT (OUTPATIENT)
Dept: DERMATOLOGY | Facility: CLINIC | Age: 24
End: 2019-08-16
Payer: COMMERCIAL

## 2019-08-16 DIAGNOSIS — L63.9 AA (ALOPECIA AREATA): ICD-10-CM

## 2019-08-16 DIAGNOSIS — L63.9 ALOPECIA AREATA: Primary | ICD-10-CM

## 2019-08-16 RX ORDER — CLOBETASOL PROPIONATE 0.05 G/100ML
SHAMPOO TOPICAL
Qty: 118 ML | Refills: 3 | Status: ON HOLD | OUTPATIENT
Start: 2019-08-16 | End: 2019-09-01

## 2019-08-16 ASSESSMENT — PAIN SCALES - GENERAL: PAINLEVEL: NO PAIN (0)

## 2019-08-16 NOTE — LETTER
Date:August 19, 2019      Patient was self referred, no letter generated. Do not send.        [facility] Health Information

## 2019-08-16 NOTE — PROGRESS NOTES
Drug Administration Record    Prior to injection, verified patient identity using patient's name and date of birth.  Due to injection administration, patient instructed to remain in clinic for 15 minutes  afterwards, and to report any adverse reaction to me immediately.    Drug Name: triamcinolone acetonide(kenalog)  Dose: 1mL of triamcinolone 10mg/mL, 50mg dose  Route administered: ID  NDC #: lka6547: Kenalog-10 (1201-7226-66)  Amount of waste(mL):4mL  Reason for waste: Multi dose vial    LOT #: [id number]  SITE: see note  : [employer]  EXPIRATION DATE: JAN2021

## 2019-08-16 NOTE — LETTER
8/16/2019       RE: Kevin Agee  [address]  [place] [address]     Dear Colleague,    Thank you for referring your patient, Kevin Agee, to the [facility] DERMATOLOGY at [facility]. Please see a copy of my visit note below.    [facility] Dermatology Note    Dermatology Problem List:  1. Alopecia Areata  -Clobetasol shampoo three times per week, ILK-10, 11/7/18, 12/05/18, 8/16/19  2. Prurigo nodule, central posterior scalp   - s/p ILK-10, 11/7/18    Encounter Date: Aug 16, 2019    CC:   Chief Complaint   Patient presents with     Derm Problem     follow up alopecia areata, noticing new hairloss on R scalp       History of Present Illness:  This 22 year old male presents for an alopecia areata follow up. The patient was last seen in the dermatology clinic on 12/05/19 during which 0.4cc of ILK 10 was injected into 3 lesions on the posterior scalp for his alopecia areata  .     Today he reports his scalp was doing well until about 2 months ago, when he noticed a new area of hair loss on the right side of his scalp. He recently ran out of the clobetasol shampoo. The last time he used this topical was 3 months ago.     Otherwise the patient reports no additional painful, bleeding, nonhealing or pruritic lesions and denies any new or changing moles.    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 No pertinent family history.       Medications:  Current Outpatient Medications   Medication Sig Dispense Refill     B Complex-Biotin-FA (B-COMPLEX PO)        clobetasol propionate 0.05 %  SHAM Apply to entire dry scalp then wash out three times per week 118 mL 3     KRILL OIL PO        MAGNESIUM OXIDE PO        Multiple Vitamins-Minerals (MULTIVITAMIN/EXTRA VITAMIN D3 PO)        Allergies:  No Known Allergies  Review of Systems:  Skin: As per HPI. No additional skin concerns.  Constitutional: The patient has been feeling generally well     Physical exam:  There were no vitals taken for this visit.  - This is a well developed, well-nourished male in no acute distress, in a pleasant mood.    - Lynn Skin Type IV  - SKIN: A focused examination of the scalp and face was performed. The examination significant for:    - There are 2 dime size patches of alopecia on the right lateral occipital scalp and right lower parietal scalp  - Significant regrowth noted to alopecia patch, at periphery there is slight perifollicular attentuation  - Eyebrows and eyelashes wnl  - No evidence of lichenified papule to the central posterior scalp   - No other concerning features noted     Impression/Plan:  1. Alopecia Areata: Improvement s/p ILK on 11/7/18  Continue clobetasol 0.05% shampoo, applying to entire scalp then rinse. Repeat 3 times per week. Refill provided at today's visit. Pt instructed to wait at least 1 week after ILK injections to begin use.  Kenalog intralesional injection procedure note: After verbal consent and discussion of risks including but not limited to atrophy, pain, and bruising, time out was performed, the patient underwent positioning and the area was prepped with isopropyl alcohol, 0.8 total cc of Kenalog 10 mg/cc was injected into 2 lesions on the right lateral occipital scalp and right lower parietal scalp.  The patient tolerated the procedure well and left the Dermatology clinic in good condition.     Follow up in 4-6 weeks, earlier for new or worsening hair loss     Staff Involved:  Scribe/Staff    Scribe Disclosure:   Kathryn JUAN, am serving as a scribe to document services  personally performed by Kaylah Schulz PA-C, based on data collection and the provider's statements to me.    Provider Disclosure:   The documentation recorded by the scribe accurately reflects the services I personally performed and the decisions made by me.    All risks, benefits and alternatives were discussed with patient.  Patient is in agreement and understands the assessment and plan.  All questions were answered.  Sun Screen Education was given.   Return to Clinic in 4-6 weeks or sooner as needed.   Kaylah Schulz PA-C   [facility]     Drug Administration Record    Prior to injection, verified patient identity using patient's name and date of birth.  Due to injection administration, patient instructed to remain in clinic for 15 minutes  afterwards, and to report any adverse reaction to me immediately.    Drug Name: triamcinolone acetonide(kenalog)  Dose: 1mL of triamcinolone 10mg/mL, 50mg dose  Route administered: ID  NDC #: [id number]: Kenalog-10 (1090-7940-77)  Amount of waste(mL):4mL  Reason for waste: Multi dose vial    LOT #: [id number]  SITE: see note  : [employer]  EXPIRATION DATE: JAN2021      Again, thank you for allowing me to participate in the care of your patient.      Sincerely,    Kaylah Schulz PA-C

## 2019-08-16 NOTE — PROGRESS NOTES
[facility] Dermatology Note    Dermatology Problem List:  1. Alopecia Areata  -Clobetasol shampoo three times per week, ILK-10, 11/7/18, 12/05/18, 8/16/19  2. Prurigo nodule, central posterior scalp   - s/p ILK-10, 11/7/18    Encounter Date: Aug 16, 2019    CC:   Chief Complaint   Patient presents with     Derm Problem     follow up alopecia areata, noticing new hairloss on R scalp       History of Present Illness:  This 22 year old male presents for an alopecia areata follow up. The patient was last seen in the dermatology clinic on 12/05/19 during which 0.4cc of ILK 10 was injected into 3 lesions on the posterior scalp for his alopecia areata  .     Today he reports his scalp was doing well until about 2 months ago, when he noticed a new area of hair loss on the right side of his scalp. He recently ran out of the clobetasol shampoo. The last time he used this topical was 3 months ago.     Otherwise the patient reports no additional painful, bleeding, nonhealing or pruritic lesions and denies any new or changing moles.    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 No pertinent family history.       Medications:  Current Outpatient Medications   Medication Sig Dispense Refill     B Complex-Biotin-FA (B-COMPLEX PO)        clobetasol propionate 0.05 % SHAM Apply to entire dry scalp then wash out three times per week 118 mL 3     KRILL OIL PO        MAGNESIUM OXIDE PO        Multiple Vitamins-Minerals (MULTIVITAMIN/EXTRA VITAMIN D3 PO)        Allergies:  No Known Allergies  Review of Systems:  Skin: As per HPI. No  additional skin concerns.  Constitutional: The patient has been feeling generally well     Physical exam:  There were no vitals taken for this visit.  - This is a well developed, well-nourished male in no acute distress, in a pleasant mood.    - Lynn Skin Type IV  - SKIN: A focused examination of the scalp and face was performed. The examination significant for:    - There are 2 dime size patches of alopecia on the right lateral occipital scalp and right lower parietal scalp  - Significant regrowth noted to alopecia patch, at periphery there is slight perifollicular attentuation  - Eyebrows and eyelashes wnl  - No evidence of lichenified papule to the central posterior scalp   - No other concerning features noted     Impression/Plan:  1. Alopecia Areata: Improvement s/p ILK on 11/7/18  Continue clobetasol 0.05% shampoo, applying to entire scalp then rinse. Repeat 3 times per week. Refill provided at today's visit. Pt instructed to wait at least 1 week after ILK injections to begin use.  Kenalog intralesional injection procedure note: After verbal consent and discussion of risks including but not limited to atrophy, pain, and bruising, time out was performed, the patient underwent positioning and the area was prepped with isopropyl alcohol, 0.8 total cc of Kenalog 10 mg/cc was injected into 2 lesions on the right lateral occipital scalp and right lower parietal scalp.  The patient tolerated the procedure well and left the Dermatology clinic in good condition.     Follow up in 4-6 weeks, earlier for new or worsening hair loss     Staff Involved:  Scribe/Staff    Scribe Disclosure:   YESSICA, Kathryn Crawford, am serving as a scribe to document services personally performed by Kaylah Schulz PA-C, based on data collection and the provider's statements to me.    Provider Disclosure:   The documentation recorded by the scribe accurately reflects the services I personally performed and the decisions made by me.    All risks,  benefits and alternatives were discussed with patient.  Patient is in agreement and understands the assessment and plan.  All questions were answered.  Sun Screen Education was given.   Return to Clinic in 4-6 weeks or sooner as needed.   Kaylah Schulz PA-C   [facility]

## 2019-08-16 NOTE — NURSING NOTE
Chief Complaint   Patient presents with     Derm Problem     follow up alopecia areata, noticing new hairloss on R scalp     Jyotsna Eastman, EMT

## 2019-09-01 ENCOUNTER — HOSPITAL ENCOUNTER (OUTPATIENT)
Facility: CLINIC | Age: 24
Setting detail: OBSERVATION
Discharge: HOME | End: 2019-09-02
Attending: EMERGENCY MEDICINE | Admitting: FAMILY MEDICINE
Payer: COMMERCIAL

## 2019-09-01 DIAGNOSIS — L03.314 CELLULITIS OF GROIN: ICD-10-CM

## 2019-09-01 DIAGNOSIS — L02.91 ABSCESS: Primary | ICD-10-CM

## 2019-09-01 LAB
ALBUMIN SERPL-MCNC: 3.5 G/DL (ref 3.4–5)
ALP SERPL-CCNC: 70 U/L (ref 40–150)
ALT SERPL W P-5'-P-CCNC: 29 U/L (ref 0–70)
ANION GAP SERPL CALCULATED.3IONS-SCNC: 4 MMOL/L (ref 3–14)
AST SERPL W P-5'-P-CCNC: 17 U/L (ref 0–45)
BASOPHILS # BLD AUTO: 0 10E9/L (ref 0–0.2)
BASOPHILS NFR BLD AUTO: 0.6 %
BILIRUB SERPL-MCNC: 0.4 MG/DL (ref 0.2–1.3)
BUN SERPL-MCNC: 15 MG/DL (ref 7–30)
CALCIUM SERPL-MCNC: 9 MG/DL (ref 8.5–10.1)
CHLORIDE SERPL-SCNC: 108 MMOL/L (ref 94–109)
CO2 SERPL-SCNC: 28 MMOL/L (ref 20–32)
CREAT SERPL-MCNC: 0.99 MG/DL (ref 0.66–1.25)
CRP SERPL-MCNC: 24 MG/L (ref 0–8)
DIFFERENTIAL METHOD BLD: NORMAL
EOSINOPHIL # BLD AUTO: 0.2 10E9/L (ref 0–0.7)
EOSINOPHIL NFR BLD AUTO: 3.1 %
ERYTHROCYTE [DISTWIDTH] IN BLOOD BY AUTOMATED COUNT: 11.9 % (ref 10–15)
ERYTHROCYTE [SEDIMENTATION RATE] IN BLOOD BY WESTERGREN METHOD: 14 MM/H (ref 0–15)
GFR SERPL CREATININE-BSD FRML MDRD: >90 ML/MIN/{1.73_M2}
GLUCOSE SERPL-MCNC: 90 MG/DL (ref 70–99)
GRAM STN SPEC: NORMAL
GRAM STN SPEC: NORMAL
HCT VFR BLD AUTO: 44.3 % (ref 40–53)
HGB BLD-MCNC: 14.5 G/DL (ref 13.3–17.7)
IMM GRANULOCYTES # BLD: 0 10E9/L (ref 0–0.4)
IMM GRANULOCYTES NFR BLD: 0.2 %
LACTATE BLD-SCNC: 0.8 MMOL/L (ref 0.7–2)
LYMPHOCYTES # BLD AUTO: 2 10E9/L (ref 0.8–5.3)
LYMPHOCYTES NFR BLD AUTO: 40.5 %
Lab: NORMAL
MCH RBC QN AUTO: 30.1 PG (ref 26.5–33)
MCHC RBC AUTO-ENTMCNC: 32.7 G/DL (ref 31.5–36.5)
MCV RBC AUTO: 92 FL (ref 78–100)
MONOCYTES # BLD AUTO: 0.5 10E9/L (ref 0–1.3)
MONOCYTES NFR BLD AUTO: 9.3 %
NEUTROPHILS # BLD AUTO: 2.2 10E9/L (ref 1.6–8.3)
NEUTROPHILS NFR BLD AUTO: 46.3 %
NRBC # BLD AUTO: 0 10*3/UL
NRBC BLD AUTO-RTO: 0 /100
PLATELET # BLD AUTO: 180 10E9/L (ref 150–450)
POTASSIUM SERPL-SCNC: 4.1 MMOL/L (ref 3.4–5.3)
PROT SERPL-MCNC: 7.5 G/DL (ref 6.8–8.8)
RBC # BLD AUTO: 4.81 10E12/L (ref 4.4–5.9)
SODIUM SERPL-SCNC: 140 MMOL/L (ref 133–144)
SPECIMEN SOURCE: NORMAL
WBC # BLD AUTO: 4.8 10E9/L (ref 4–11)

## 2019-09-01 PROCEDURE — 87070 CULTURE OTHR SPECIMN AEROBIC: CPT | Performed by: EMERGENCY MEDICINE

## 2019-09-01 PROCEDURE — 96365 THER/PROPH/DIAG IV INF INIT: CPT | Performed by: EMERGENCY MEDICINE

## 2019-09-01 PROCEDURE — 85025 COMPLETE CBC W/AUTO DIFF WBC: CPT | Performed by: EMERGENCY MEDICINE

## 2019-09-01 PROCEDURE — 80053 COMPREHEN METABOLIC PANEL: CPT | Performed by: EMERGENCY MEDICINE

## 2019-09-01 PROCEDURE — 85652 RBC SED RATE AUTOMATED: CPT | Performed by: STUDENT IN AN ORGANIZED HEALTH CARE EDUCATION/TRAINING PROGRAM

## 2019-09-01 PROCEDURE — 99285 EMERGENCY DEPT VISIT HI MDM: CPT | Mod: 25 | Performed by: EMERGENCY MEDICINE

## 2019-09-01 PROCEDURE — 25000128 H RX IP 250 OP 636: Performed by: EMERGENCY MEDICINE

## 2019-09-01 PROCEDURE — G0378 HOSPITAL OBSERVATION PER HR: HCPCS

## 2019-09-01 PROCEDURE — 25000128 H RX IP 250 OP 636: Performed by: STUDENT IN AN ORGANIZED HEALTH CARE EDUCATION/TRAINING PROGRAM

## 2019-09-01 PROCEDURE — 96376 TX/PRO/DX INJ SAME DRUG ADON: CPT

## 2019-09-01 PROCEDURE — 87205 SMEAR GRAM STAIN: CPT | Performed by: EMERGENCY MEDICINE

## 2019-09-01 PROCEDURE — 86140 C-REACTIVE PROTEIN: CPT | Performed by: EMERGENCY MEDICINE

## 2019-09-01 PROCEDURE — 12000001 ZZH R&B MED SURG/OB UMMC

## 2019-09-01 PROCEDURE — 87077 CULTURE AEROBIC IDENTIFY: CPT | Performed by: EMERGENCY MEDICINE

## 2019-09-01 PROCEDURE — 83605 ASSAY OF LACTIC ACID: CPT | Performed by: EMERGENCY MEDICINE

## 2019-09-01 PROCEDURE — 87186 SC STD MICRODIL/AGAR DIL: CPT | Performed by: EMERGENCY MEDICINE

## 2019-09-01 PROCEDURE — 36415 COLL VENOUS BLD VENIPUNCTURE: CPT | Performed by: STUDENT IN AN ORGANIZED HEALTH CARE EDUCATION/TRAINING PROGRAM

## 2019-09-01 PROCEDURE — 96375 TX/PRO/DX INJ NEW DRUG ADDON: CPT

## 2019-09-01 PROCEDURE — 99285 EMERGENCY DEPT VISIT HI MDM: CPT | Mod: Z6 | Performed by: EMERGENCY MEDICINE

## 2019-09-01 RX ORDER — FLUTICASONE PROPIONATE 50 MCG
2 SPRAY, SUSPENSION (ML) NASAL DAILY
COMMUNITY

## 2019-09-01 RX ORDER — CEPHALEXIN 500 MG/1
500 CAPSULE ORAL 2 TIMES DAILY
Status: ON HOLD | COMMUNITY
End: 2019-09-02

## 2019-09-01 RX ORDER — MONTELUKAST SODIUM 10 MG/1
10 TABLET ORAL DAILY
COMMUNITY

## 2019-09-01 RX ORDER — CEFTRIAXONE 1 G/1
1 INJECTION, POWDER, FOR SOLUTION INTRAMUSCULAR; INTRAVENOUS EVERY 24 HOURS
Status: DISCONTINUED | OUTPATIENT
Start: 2019-09-01 | End: 2019-09-02

## 2019-09-01 RX ORDER — NALOXONE HYDROCHLORIDE 0.4 MG/ML
.1-.4 INJECTION, SOLUTION INTRAMUSCULAR; INTRAVENOUS; SUBCUTANEOUS
Status: DISCONTINUED | OUTPATIENT
Start: 2019-09-01 | End: 2019-09-02 | Stop reason: HOSPADM

## 2019-09-01 RX ORDER — DOXYCYCLINE 100 MG/1
100 CAPSULE ORAL 2 TIMES DAILY
Status: ON HOLD | COMMUNITY
End: 2019-09-02

## 2019-09-01 RX ORDER — LIDOCAINE HYDROCHLORIDE 10 MG/ML
INJECTION, SOLUTION EPIDURAL; INFILTRATION; INTRACAUDAL; PERINEURAL
Status: DISCONTINUED
Start: 2019-09-01 | End: 2019-09-01 | Stop reason: HOSPADM

## 2019-09-01 RX ORDER — ACETAMINOPHEN 325 MG/1
650 TABLET ORAL EVERY 4 HOURS PRN
Status: DISCONTINUED | OUTPATIENT
Start: 2019-09-01 | End: 2019-09-02 | Stop reason: HOSPADM

## 2019-09-01 RX ORDER — CLOBETASOL PROPIONATE 0.05 G/100ML
SHAMPOO TOPICAL
COMMUNITY

## 2019-09-01 RX ORDER — VANCOMYCIN HYDROCHLORIDE 1 G/200ML
1000 INJECTION, SOLUTION INTRAVENOUS EVERY 12 HOURS
Status: DISCONTINUED | OUTPATIENT
Start: 2019-09-01 | End: 2019-09-02

## 2019-09-01 RX ORDER — IBUPROFEN 200 MG
600 TABLET ORAL EVERY 6 HOURS PRN
Status: DISCONTINUED | OUTPATIENT
Start: 2019-09-01 | End: 2019-09-02 | Stop reason: HOSPADM

## 2019-09-01 RX ORDER — ONDANSETRON 4 MG/1
4 TABLET, ORALLY DISINTEGRATING ORAL EVERY 6 HOURS PRN
Status: DISCONTINUED | OUTPATIENT
Start: 2019-09-01 | End: 2019-09-02 | Stop reason: HOSPADM

## 2019-09-01 RX ORDER — LIDOCAINE 40 MG/G
CREAM TOPICAL
Status: DISCONTINUED | OUTPATIENT
Start: 2019-09-01 | End: 2019-09-02 | Stop reason: HOSPADM

## 2019-09-01 RX ORDER — AMOXICILLIN 250 MG
2 CAPSULE ORAL 2 TIMES DAILY PRN
Status: DISCONTINUED | OUTPATIENT
Start: 2019-09-01 | End: 2019-09-02 | Stop reason: HOSPADM

## 2019-09-01 RX ORDER — ONDANSETRON 2 MG/ML
4 INJECTION INTRAMUSCULAR; INTRAVENOUS EVERY 6 HOURS PRN
Status: DISCONTINUED | OUTPATIENT
Start: 2019-09-01 | End: 2019-09-02 | Stop reason: HOSPADM

## 2019-09-01 RX ORDER — AMOXICILLIN 250 MG
1 CAPSULE ORAL 2 TIMES DAILY PRN
Status: DISCONTINUED | OUTPATIENT
Start: 2019-09-01 | End: 2019-09-02 | Stop reason: HOSPADM

## 2019-09-01 RX ADMIN — CEFTRIAXONE 1 G: 1 INJECTION, POWDER, FOR SOLUTION INTRAMUSCULAR; INTRAVENOUS at 15:45

## 2019-09-01 RX ADMIN — VANCOMYCIN HYDROCHLORIDE 1000 MG: 1 INJECTION, SOLUTION INTRAVENOUS at 11:58

## 2019-09-01 RX ADMIN — VANCOMYCIN HYDROCHLORIDE 1000 MG: 1 INJECTION, SOLUTION INTRAVENOUS at 23:29

## 2019-09-01 ASSESSMENT — ENCOUNTER SYMPTOMS
NAUSEA: 0
DIARRHEA: 0
DIAPHORESIS: 1
APPETITE CHANGE: 0
DIFFICULTY URINATING: 0
COLOR CHANGE: 1
FEVER: 0

## 2019-09-01 ASSESSMENT — ACTIVITIES OF DAILY LIVING (ADL)
ADLS_ACUITY_SCORE: 12
ADLS_ACUITY_SCORE: 10

## 2019-09-01 ASSESSMENT — MIFFLIN-ST. JEOR
SCORE: 1981.94
SCORE: 1968.75
SCORE: 1978.77

## 2019-09-01 NOTE — ED NOTES
[facility], [place]   ED Nurse to Floor Handoff     Kevin Agee is a 23 year old male who speaks English and lives with others,  in a home  They arrived in the ED by unknown from home    ED Chief Complaint: Skin Ulcer    ED Dx;   Final diagnoses:   Cellulitis of groin         Needed?: No    Allergies: No Known Allergies.  Past Medical Hx:   Past Medical History:   Diagnosis Date     Flat feet, bilateral      Migraine       Baseline Mental status: WDL  Current Mental Status changes: at basesline    Infection present or suspected this encounter: yes other groin  Sepsis suspected: No  Isolation type: No active isolations     Activity level - Baseline/Home:  Independent  Activity Level - Current:   Independent    Bariatric equipment needed?: No    In the ED these meds were given:   Medications   lidocaine (PF) (XYLOCAINE) 1 % injection (has no administration in time range)   vancomycin (VANCOCIN) 1000 mg in dextrose 5% 200 mL PREMIX (has no administration in time range)       Drips running?  Yes    Home pump  No    Current LDAs  Peripheral IV 09/01/19 Right Upper arm (Active)   Site Assessment WDL 9/1/2019 10:18 AM   Line Status Saline locked 9/1/2019 10:18 AM   Number of days: 0       Wound 04/08/18 Left Face Laceration (Active)   Number of days: 511       Labs results:   Labs Ordered and Resulted from Time of ED Arrival Up to the Time of Departure from the ED   CRP INFLAMMATION - Abnormal; Notable for the following components:       Result Value    CRP Inflammation 24.0 (*)     All other components within normal limits   CBC WITH PLATELETS DIFFERENTIAL   COMPREHENSIVE METABOLIC PANEL   LACTIC ACID WHOLE BLOOD   WOUND CULTURE AEROBIC BACTERIAL   GRAM STAIN       Imaging Studies:   Recent Results (from the past 24 hour(s))   POC US SOFT TISSUE    Impression    Limited Soft Tissue Ultrasound, performed and interpreted by me.    Indication:  Skin redness warmth pain. Evaluate  "for cellulitis vs abscess.     Body location: abdomen    Findings:  There is cobblestoning suggestive of cellulitis in the evaluated area. There is no fluid collection to suggest abscess. No foreign body identified    IMPRESSION: Cellulitis           Recent vital signs:   /80   Temp 97.2  F (36.2  C) (Oral)   Ht 1.88 m (6' 2\")   Wt 90.7 kg (200 lb)   SpO2 97%   BMI 25.68 kg/m      Marisa Coma Scale Score: 15 (09/01/19 0935)       Cardiac Rhythm: Normal Sinus  Pt needs tele? No  Skin/wound Issues: right groin infection, increased with oral antibiotic treatment    Code Status: Full Code    Pain control: good    Nausea control: pt had none    Abnormal labs/tests/findings requiring intervention: wound culture pending    Family present during ED course? No   Family Comments/Social Situation comments: Pt is a college student at the [employer], no family present    Tasks needing completion: Iv antibiotics    Marivel Pack RN  [phone] ED    "

## 2019-09-01 NOTE — PHARMACY-VANCOMYCIN DOSING SERVICE
Pharmacy Vancomycin Initial Note  Date of Service 2019  Patient's  1995  23 year old male    Indication: Skin and Soft Tissue Infection    Current estimated CrCl = Estimated Creatinine Clearance: 150.9 mL/min (based on SCr of 0.99 mg/dL).    Creatinine for last 3 days  2019: 10:18 AM Creatinine 0.99 mg/dL    Recent Vancomycin Level(s) for last 3 days  No results found for requested labs within last 72 hours.      Vancomycin IV Administrations (past 72 hours)                   vancomycin (VANCOCIN) 1000 mg in dextrose 5% 200 mL PREMIX (mg) 1,000 mg New Bag 19 1158                Nephrotoxins and other renal medications (From now, onward)    Start     Dose/Rate Route Frequency Ordered Stop    19 1512  ibuprofen (ADVIL/MOTRIN) tablet 600 mg      600 mg Oral EVERY 6 HOURS PRN 19 1512      19 1133  vancomycin (VANCOCIN) 1000 mg in dextrose 5% 200 mL PREMIX      1,000 mg  200 mL/hr over 1 Hours Intravenous EVERY 12 HOURS 19 1132          Contrast Orders - past 72 hours (72h ago, onward)    None            Plan:  1.  Start vancomycin 1000 mg IV q12h.   2.  Goal Trough Level: 10-15 mg/L   3.  Pharmacy will check trough levels as appropriate in 1-3 Days.    4. Serum creatinine levels will be ordered a minimum of twice weekly.    5. Connell method utilized to dose vancomycin therapy: Method 2     Hanh Teague, PharmD

## 2019-09-01 NOTE — PHARMACY-ADMISSION MEDICATION HISTORY
Admission medication history interview status for the 9/1/2019 admission is complete. See Epic admission navigator for allergy information, pharmacy, prior to admission medications and immunization status.     Medication history interview sources:  Patient    Changes made to PTA medication list (reason)  Added: Flonase, Montelukast, Allegra  Deleted: Vitamin B Complex, Krill Oil, Magnesium Oxide, Multivitamin  Changed: Clobetasol 0.05%  Three times weekly to twice weekly    Additional medication history information (including reliability of information, actions taken by pharmacist):    Patient was a reliable source of information for med rec.    +Patient is still currently taking both antibiotics (Cephalexin/Doxycycline) and their last dose was this morning.  +Patient is also taking allergy medications which were all taken this morning. They didn't know the dose of Allegra they take, but is only taken once daily  +Patient said they have no known allergies.      Prior to Admission medications    Medication Sig Last Dose Taking? Auth Provider   cephALEXin (KEFLEX) 500 MG capsule Take 500 mg by mouth 2 times daily 9/1/2019 at AM Yes Reported, Patient   clobetasol propionate (CLOBEX) 0.05 % external shampoo Apply to entire dry scalp then wash out 2 times a week Past Week at Unknown time Yes Unknown, Entered By History   doxycycline monohydrate (MONODOX) 100 MG capsule Take 100 mg by mouth 2 times daily 9/1/2019 at AM Yes Reported, Patient   Fexofenadine HCl (ALLEGRA PO) Take by mouth daily 9/1/2019 at AM Yes Unknown, Entered By History   fluticasone (FLONASE) 50 MCG/ACT nasal spray Spray 2 sprays into both nostrils daily 9/1/2019 at AM Yes Unknown, Entered By History   montelukast (SINGULAIR) 10 MG tablet Take 10 mg by mouth daily 9/1/2019 at AM Yes Unknown, Entered By History         Medication history completed by: Juancho Jacobsen, Pharmacy Intern

## 2019-09-01 NOTE — H&P
"[facility] History and Physical - [place]'s  Service       Date of Admission:  9/1/2019    Chief Complaint : Right groin abscess    History of Present Illness   Kevin Agee is a 24yo previously healthy male who was admitted on 9/1/19 for management of right groin abscess after failing outpatient treatment.    Initially went to [facility] on Wed (8/28) with swelling and pain of groin and was prescribed cephalexin for cellulitis, which he started at that time. No improvement in symptoms, so he returned to [facility] on Friday (8/30). At that point, they did an I&D and sent wound cultures. They added doxycycline at this point. He has been taking both medications as prescribed (500mg cephalexin BID, 100mb doxycycline BID), but patient notes continued spread of erythema and return of painful \"golf-ball sized lump,\" which prompted his visit to the ED today (9/1). Patient reports tingly, hot sensation and tenderness in right groin. (At worst, pain was 8/10. Currently 3-4/10 and tolerable.) Also having lightheadedness, headaches, and sweating at night. Denies fever, nausea, vomiting, penile discharge/lesions, changes in urination, abdominal discomfort, diarrhea, or difficulty tolerating oral intake. No known traumatic incident. Shaves pubic hair, but last shaved 2-3 months ago. Has been sexually active with three women in the past year. He has not been tested for STIs and is not aware of whether they had been tested or not. They do use condoms every time.     In ED, Kevin had spread of erythema beyond margins previously drawn, but exam otherwise unchanged. Afebrile. No leukocytosis. The abscess was once again drained. Subsequent ultrasound was suggestive of cellulitis (though this was after abscess had been drained).     Review of Systems   The 10 point Review of Systems is negative other than noted in the HPI or here.     Past Medical History    Occasional " migraines, relieved with advil.   Seasonal allergies.   Lactose intolerance.     Past Surgical History   I have reviewed this patient's surgical history and updated it with pertinent information if needed.  Past Surgical History:   Procedure Laterality Date     ANKLE SURGERY      To fix bilateral flat feet     Social History   Lives in apartment with roommates.       Sexually active with 3 women in last year. Has never been tested for STIs, nor have his partners. Uses condoms every time.       At the [employer], will graduate this May 2020.       Drinks 2-3 drinks about two times per week.       Uses marijuana less than once per month. No other drug use.        Family History   I have reviewed this patient's family history and updated it with pertinent information if needed.   History reviewed. No pertinent family history.    Prior to Admission Medications   Prior to Admission Medications   Prescriptions Last Dose Informant Patient Reported? Taking?   Fexofenadine HCl (ALLEGRA PO) 9/1/2019 at AM  Yes Yes   Sig: Take by mouth daily   cephALEXin (KEFLEX) 500 MG capsule 9/1/2019 at AM  Yes Yes   Sig: Take 500 mg by mouth 2 times daily   clobetasol propionate (CLOBEX) 0.05 % external shampoo Past Week at Unknown time  Yes Yes   Sig: Apply to entire dry scalp then wash out 2 times a week   doxycycline monohydrate (MONODOX) 100 MG capsule 9/1/2019 at AM  Yes Yes   Sig: Take 100 mg by mouth 2 times daily   fluticasone (FLONASE) 50 MCG/ACT nasal spray 9/1/2019 at AM  Yes Yes   Sig: Spray 2 sprays into both nostrils daily   montelukast (SINGULAIR) 10 MG tablet 9/1/2019 at AM  Yes Yes   Sig: Take 10 mg by mouth daily      Facility-Administered Medications: None     Allergies   No Known Allergies    Physical Exam   Vital Signs: Temp: 97.3  F (36.3  C) Temp src: Oral BP: 127/71 Pulse: 62 Heart Rate: 61 Resp: 16 SpO2: 99 % O2 Device: None (Room air)    Weight: 199 lbs 4.8 oz    General Appearance:  appears generally well, laying in bed comfortably and interacting appropriately, non-toxic and in no acute distress  Eyes: anicteric, EOMi, not injected  HEENT: normocephalic, atraumatic, neck range of motion intact  Respiratory: breathing comfortably on room air without increased effort, LCTAB without crackles or wheezes  Cardiovascular: regular rate and rhythm with occasional PACs, no other extra sounds, no edema; distal extremities cool to the touch (he says this is baseline for him), distal pulses strong  GI: bowel sounds present, non-tender to palpation, soft, non-distended  Lymph/Hematologic: tender right-inguinal lymphaenopathy  Genitourinary: no swelling or erythema of scrotum, no lesions on penis  Skin: diffuse faint erythematous patch of right groin, as pictured below (black line drawn today 9/1; purple line drawn 8/28); warm and tender to the touch; no streaking; no involvement of scrotum or perineum          Musculoskeletal: Full hip flexion and extension (though pain at maximum flexion of right extremity); full internal and external rotation of hips without pain  Neuro: alert and oriented to person, place, time, and event    Assessment & Plan   Kevin Agee is a 24yo previously healthy male who was admitted on 9/1/19 for management of right groin abscess after failing outpatient treatment.    # Right groin abscess: Patient is stable and erythema has receded mildly since starting IV vancomycin. Afebrile; no leukocytosis, normal lactic acid. Does have elevated CRP to 24.Failed outpatient cephalexin/doxy. S/p I&Dx2. Wound cultures pending at [facility] from 8/28 and here from 9/1. Will not obtain blood cultures at this time as patient is afebrile without leukocytosis, making concern for bacteremia quite low. Added ceftriaxone to vancomycin to provide gram negative coverage and better coverage of strep and MSSA. Will narrow antibiotics as able.   - ESR pending   - Pharm to dose vanc   - Ceftriaxone 1g  daily    - BMP (assess renal function with vanc) daily (but okay to discontinue if renal function stable and WNL)   - CBC (monitor for leukocytosis) daily (but okay to discontinue if counts are stable and WNL)   - Wound cultures pending; [facility]    # Tender inguinal lymphadenopathy: most likely 2/2 abscess; however, STI such as lymphogranuloma venereum vs syphillis also on differential. STI labs sent an pending. Did not ask patient what types of sex he has, thus negative urine PCR samples will not necessarily rule out gonorrhea/chlamydia.    - STI panel pending    Cellulitis vs abscess. Physical exam and history suggestive of abscess, but ultrasound suggests cellulitis is more likely. ; CRP 24. Lactic acid 0.8. No leukocytosis.    - incision and drainage   - antibiotics   - Wound cultures and gram stain pending.    - Recommend blood cultures also    # Pain Assessment:   - Experiencing 3-4/10 tenderness of right groin, but not wanting any medication at this time. Tylenol and ibuprofen available PRN.       Diet: Combination Diet Regular Diet Adult  Fluids: PO   DVT Prophylaxis: Pneumatic Compression Devices (but low risk and ambulatory, so okay to discontinue)   Code Status: Full Code    Disposition Plan   Expected discharge: 2 - 3 days; recommended to prior living arrangement once antibiotic plan established.Dispo:   pending improvement in cellulitis with wound culture return to guide antibiotic choice.   Entered: Scarlet Carrasquillo 09/01/2019, 4:23 PM    Scarlet Carrasquillo MD  [facility]   Pager: [phone]      Data   Recent Labs   Lab 09/01/19  1018   WBC 4.8   HGB 14.5   MCV 92         POTASSIUM 4.1   CHLORIDE 108   CO2 28   BUN 15   CR 0.99   ANIONGAP 4   GONZÁLEZ 9.0   GLC 90   ALBUMIN 3.5   PROTTOTAL 7.5   BILITOTAL 0.4   ALKPHOS 70   ALT 29   AST 17     Recent Results (from the past 24 hour(s))    POC US SOFT TISSUE    Impression    Limited Soft Tissue Ultrasound, performed and interpreted by me.    Indication:  Skin redness warmth pain. Evaluate for cellulitis vs abscess.     Body location: abdomen    Findings:  There is cobblestoning suggestive of cellulitis in the evaluated area. There is no fluid collection to suggest abscess. No foreign body identified    IMPRESSION: Cellulitis

## 2019-09-01 NOTE — ED PROVIDER NOTES
[place] EMERGENCY DEPARTMENT ([facility])  9/01/19    History     Chief Complaint   Patient presents with     Skin Ulcer     The history is provided by the patient and medical records.     Kevin Agee is a 23 year old male with a medical history significant for who presents to the Emergency Department for evaluation of right groin abscess. Patient reports that he recently had an abscess drained and and has been noticing redness and swelling in the infected area. He states that he was prescribed cephalexin and doxycyclene at [facility] and endorses taking both medications regularly.  He has taken 5 days of cephalexin and 2.5 days of doxycycline.  He was told to visit the [facility] Emergency Department if he noticed any enlargement or swelling after taking the antibiotics. Over the last day the area of redness has continued to extend down his leg. Patient describes feeling tingly and hot sensation in infected area, but denies any fevers, nausea, abdominal discomfort, diarrhea, difficulty urinating, or difficulty tolerating oral intake. Patient does endorse lightheadedness and diaphoresis during the night.     Patient reports seasonal allergies and notes taking allegra, singular, and flonase to treat symptoms. Patient denies knowledge of any medication allergies.    I have reviewed the Medications, Allergies, Past Medical and Surgical History, and Social History in the [facility] system.    Past Medical History:   Diagnosis Date     Flat feet, bilateral      Migraine        Past Surgical History:   Procedure Laterality Date     ANKLE SURGERY      To fix bilateral flat feet       History reviewed. No pertinent family history.    Social History     Tobacco Use     Smoking status: Never Smoker     Smokeless tobacco: Never Used   Substance Use Topics     Alcohol use: Yes     Alcohol/week: 0.0 oz     Comment: binge       Current Facility-Administered Medications   Medication     lidocaine (PF)  "(XYLOCAINE) 1 % injection     Current Outpatient Medications   Medication     cephALEXin (KEFLEX) 500 MG capsule     doxycycline monohydrate (MONODOX) 100 MG capsule     B Complex-Biotin-FA (B-COMPLEX PO)     clobetasol propionate (CLOBEX) 0.05 % external shampoo     KRILL OIL PO     MAGNESIUM OXIDE PO     Multiple Vitamins-Minerals (MULTIVITAMIN/EXTRA VITAMIN D3 PO)      No Known Allergies     Review of Systems   Constitutional: Positive for diaphoresis. Negative for appetite change and fever.   Gastrointestinal: Negative for diarrhea and nausea.   Genitourinary: Negative for difficulty urinating.   Skin: Positive for color change (redness).   All other systems reviewed and are negative.      Physical Exam   BP: 139/80  Heart Rate: 61  Temp: 97.2  F (36.2  C)  Height: 188 cm (6' 2\")  Weight: 90.7 kg (200 lb)  SpO2: 97 %      Physical Exam  General: patient is alert and oriented and in no acute distress   Head: atraumatic and normocephalic   EENT: moist mucus membranes without tonsillar erythema or exudates, pupils round and reactive   Neck: supple   Cardiovascular: regular rate and rhythm, extremities warm and well perfused, no lower extremity edema  Pulmonary: lungs clear to auscultation bilaterally   Abdomen: soft, non-tender   Musculoskeletal: normal range of motion of the right hip with full internal and external rotation  Neurological: alert and oriented, moving all extremities symmetrically, gait normal   Skin: warm, dry, abscess site in the right groin with packing in place, erythema extending down the anterior right leg, no extension medially or up the abdominal wall      ED Course   9:45 AM  The patient was seen and examined by Rachael Galeas in Room ED06.        Procedures             Critical Care time:  none             Labs Ordered and Resulted from Time of ED Arrival Up to the Time of Departure from the ED - No data to display         Assessments & Plan (with Medical Decision Making)   This is a " 23-year-old male with history of seasonal allergies who presents to the emergency department with a right groin abscess and expanding cellulitis despite outpatient antibiotics.  On bedside ultrasound he does not have evidence of persistent abscess cavity.  The packing was removed and cavity was irrigated.  A new packing was placed.  Labs are notable for .  He denies any fevers but did report night sweats last night.  He is otherwise well-appearing without evidence of sepsis.  No evidence of joint involvement or septic arthritis.  Does not have signs of necrotizing infection.  No signs of Kamran's.  He is otherwise not immunocompromised.  He has been taking Keflex and doxycycline regularly however he continues to have expanding erythema.  He was started on vancomycin and plan to admit to medicine for continued antibiotics in the setting of outpatient failure.     This part of the medical record was transcribed by Jigar Peters Medical Scribe.     I have reviewed the nursing notes.    I have reviewed the findings, diagnosis, plan and need for follow up with the patient.    Current Discharge Medication List          Final diagnoses:   Cellulitis of groin     I, Roby Vickers, am serving as a trained medical scribe to document services personally performed by Rachael Galeas MD, based on the provider's statements to me.     I, Rachael Galeas MD, was physically present and have reviewed and verified the accuracy of this note documented by Roby Vickers.    9/1/2019   [facility], [place], EMERGENCY DEPARTMENT     Rachael Galeas MD  09/01/19 8723

## 2019-09-01 NOTE — PLAN OF CARE
"/57   Pulse 71   Temp 97.6  F (36.4  C) (Oral)   Resp 16   Ht 1.88 m (6' 2\")   Wt 90.4 kg (199 lb 4.8 oz)   SpO2 99%   BMI 25.59 kg/m      1240 1900    Neuro: A&Ox4.   Cardiac: SR. VSS.   Respiratory: Sating 99% on RA.  GI/: No BM. Adequate urine output.   Diet/appetite: Tolerating regular diet. Eating well.  Activity:  Independent up to chair, bathroom and in halls.  Pain: He mingo pain.  Skin: Pt came in with right groin abscess, site dressing is clean and dry and less reddened.  LDA's: PIV saline locked.  Plan: Continue with POC. Notify primary team with changes.  "

## 2019-09-01 NOTE — LETTER
September 3, 2019      Kevin Agee  [address] [facility] [address]        Dear Kevin,    Thank you for getting your care at [facility] Please see below for your test results. All your STI results are negative. Your wound culture sensitivities for antibiotics are not back yet.    Resulted Orders   Wound Culture Aerobic Bacterial   Result Value Ref Range    Specimen Description Groin     Special Requests Specimen collected in eSwab transport (white cap)     Culture Micro (A)      Light growth  Staphylococcus aureus  Susceptibility testing in progress      Culture Micro (A)      Light growth  Streptococcus anginosus  Susceptibility testing not routinely done     Neisseria gonorrhoea PCR   Result Value Ref Range    Specimen Descrip Urine     N Gonorrhea PCR Negative NEG^Negative      Comment:      Negative for N. gonorrhoeae rRNA by transcription mediated amplification.  A negative result by transcription mediated amplification does not preclude   the presence of N. gonorrhoeae infection because results are dependent on   proper and adequate collection, absence of inhibitors, and sufficient rRNA to   be detected.     Treponema Abs w Reflex to RPR and Titer   Result Value Ref Range    Treponema Antibodies Nonreactive NR^Nonreactive   HIV Antigen Antibody Combo   Result Value Ref Range    HIV Antigen Antibody Combo Nonreactive NR^Nonreactive          Comment:      HIV-1 p24 Ag & HIV-1/HIV-2 Ab Not Detected       Sincerely,    Augie Long, DO

## 2019-09-01 NOTE — PROGRESS NOTES
Reason for admission: For right groin abscess. Pt arrived to  at 1240  Primary team notified of pt arrival.  Admitted from: ED.  Via:  Wheelchair.  Accompanied by: Staff.  Belongings: Placed in closet; pt decline security locked fo valuable.   Admission Profile: complete  Teaching: orientation to unit and call light- call light within reach, call don't fall, use of console, meal times, when to call for the RN, and enforced importance of safety   Access: PIV  Ht./Wt.: complete  Pt status: Stable.

## 2019-09-01 NOTE — ED TRIAGE NOTES
Kevin comes to the ED today for evaluation of right sided groin redness.  He had an abscess drained earlier this week and has been taking his antibiotics as prescribed.  Denies fever however reports night sweats.

## 2019-09-01 NOTE — SUMMARY OF CARE
Pt arrived to  with the following belongings:  A backpack containing clothing, antibiotics, battery bank. Pt has a pair of sneakers, glasses, and a cell phone. Pt has a wallet containing ID, and debit cards.  Pt belongings at bedside.

## 2019-09-02 VITALS
TEMPERATURE: 96.5 F | SYSTOLIC BLOOD PRESSURE: 121 MMHG | HEART RATE: 84 BPM | OXYGEN SATURATION: 99 % | RESPIRATION RATE: 16 BRPM | BODY MASS INDEX: 25.29 KG/M2 | DIASTOLIC BLOOD PRESSURE: 61 MMHG | HEIGHT: 74 IN | WEIGHT: 197.09 LBS

## 2019-09-02 LAB
ANION GAP SERPL CALCULATED.3IONS-SCNC: 7 MMOL/L (ref 3–14)
BUN SERPL-MCNC: 16 MG/DL (ref 7–30)
CALCIUM SERPL-MCNC: 9.3 MG/DL (ref 8.5–10.1)
CHLORIDE SERPL-SCNC: 104 MMOL/L (ref 94–109)
CO2 SERPL-SCNC: 27 MMOL/L (ref 20–32)
CREAT SERPL-MCNC: 1.15 MG/DL (ref 0.66–1.25)
ERYTHROCYTE [DISTWIDTH] IN BLOOD BY AUTOMATED COUNT: 11.9 % (ref 10–15)
GFR SERPL CREATININE-BSD FRML MDRD: >90 ML/MIN/{1.73_M2}
GLUCOSE SERPL-MCNC: 84 MG/DL (ref 70–99)
HCT VFR BLD AUTO: 45 % (ref 40–53)
HGB BLD-MCNC: 14.5 G/DL (ref 13.3–17.7)
MCH RBC QN AUTO: 29.8 PG (ref 26.5–33)
MCHC RBC AUTO-ENTMCNC: 32.2 G/DL (ref 31.5–36.5)
MCV RBC AUTO: 93 FL (ref 78–100)
PLATELET # BLD AUTO: 179 10E9/L (ref 150–450)
POTASSIUM SERPL-SCNC: 4 MMOL/L (ref 3.4–5.3)
RBC # BLD AUTO: 4.86 10E12/L (ref 4.4–5.9)
SODIUM SERPL-SCNC: 138 MMOL/L (ref 133–144)
WBC # BLD AUTO: 4.9 10E9/L (ref 4–11)

## 2019-09-02 PROCEDURE — 86780 TREPONEMA PALLIDUM: CPT | Performed by: STUDENT IN AN ORGANIZED HEALTH CARE EDUCATION/TRAINING PROGRAM

## 2019-09-02 PROCEDURE — 85027 COMPLETE CBC AUTOMATED: CPT | Performed by: STUDENT IN AN ORGANIZED HEALTH CARE EDUCATION/TRAINING PROGRAM

## 2019-09-02 PROCEDURE — G0378 HOSPITAL OBSERVATION PER HR: HCPCS

## 2019-09-02 PROCEDURE — 87591 N.GONORRHOEAE DNA AMP PROB: CPT | Performed by: STUDENT IN AN ORGANIZED HEALTH CARE EDUCATION/TRAINING PROGRAM

## 2019-09-02 PROCEDURE — 36415 COLL VENOUS BLD VENIPUNCTURE: CPT | Performed by: STUDENT IN AN ORGANIZED HEALTH CARE EDUCATION/TRAINING PROGRAM

## 2019-09-02 PROCEDURE — 80048 BASIC METABOLIC PNL TOTAL CA: CPT | Performed by: STUDENT IN AN ORGANIZED HEALTH CARE EDUCATION/TRAINING PROGRAM

## 2019-09-02 PROCEDURE — 87389 HIV-1 AG W/HIV-1&-2 AB AG IA: CPT | Performed by: STUDENT IN AN ORGANIZED HEALTH CARE EDUCATION/TRAINING PROGRAM

## 2019-09-02 PROCEDURE — 87491 CHLMYD TRACH DNA AMP PROBE: CPT | Performed by: STUDENT IN AN ORGANIZED HEALTH CARE EDUCATION/TRAINING PROGRAM

## 2019-09-02 PROCEDURE — 25000132 ZZH RX MED GY IP 250 OP 250 PS 637: Performed by: STUDENT IN AN ORGANIZED HEALTH CARE EDUCATION/TRAINING PROGRAM

## 2019-09-02 RX ORDER — SULFAMETHOXAZOLE/TRIMETHOPRIM 800-160 MG
2 TABLET ORAL 2 TIMES DAILY
Status: DISCONTINUED | OUTPATIENT
Start: 2019-09-02 | End: 2019-09-02 | Stop reason: HOSPADM

## 2019-09-02 RX ORDER — SULFAMETHOXAZOLE/TRIMETHOPRIM 800-160 MG
1 TABLET ORAL EVERY 12 HOURS
Qty: 20 TABLET | Refills: 0 | Status: SHIPPED | OUTPATIENT
Start: 2019-09-02 | End: 2019-09-02

## 2019-09-02 RX ORDER — SULFAMETHOXAZOLE/TRIMETHOPRIM 800-160 MG
1 TABLET ORAL 2 TIMES DAILY
Qty: 20 TABLET | Refills: 0 | Status: CANCELLED | OUTPATIENT
Start: 2019-09-02 | End: 2019-09-12

## 2019-09-02 RX ORDER — SULFAMETHOXAZOLE/TRIMETHOPRIM 800-160 MG
1 TABLET ORAL EVERY 12 HOURS
Qty: 20 TABLET | Refills: 0 | Status: SHIPPED | OUTPATIENT
Start: 2019-09-02

## 2019-09-02 RX ADMIN — SULFAMETHOXAZOLE AND TRIMETHOPRIM 2 TABLET: 800; 160 TABLET ORAL at 11:07

## 2019-09-02 ASSESSMENT — ACTIVITIES OF DAILY LIVING (ADL)
ADLS_ACUITY_SCORE: 10

## 2019-09-02 NOTE — DISCHARGE SUMMARY
[facility], [place]    Family Medicine Discharge Summary- [facility]  Service    Date of Admission:  9/1/2019  Date of Service: 9/2/2019  Date of Discharge:  9/2/2019  Discharging Attending Provider: Dr. Jimenez  Discharge Team: Karla    Discharge Diagnoses   -R groin abscess with cellulitis    Follow-ups Needed After Discharge   -PCP within 7 days  -follow up on wound cultures, bloodwork    Hospital Course   Kevin Agee was admitted on 9/1/2019 for R groin abscess.  The following problems were addressed during his hospitalization:    #R groin abscess with cellulitis  Was prescribed cephalexin for cellulitis on Wed (8/28) with swelling and pain of groin. No improvement-on Fri (8/30) doxycycline was added and I&D performed. Patient had continued erythema and painful abscess, which prompted his visit to ED. No known trauma to site. US suggestive of cellulitis. Abscess drained again in the ED and packed. Erythema improved throughout admission. Minimal to no drainage of abscess. Lightheadedness, headaches and night sweats resolved during hospital stay. Afebrile. No leukocytosis. Was given ceftriaxone and vanco, wound cultures pending. Packing removed prior to discharge. Switched to oral bactrim.     # Tender inguinal lymphadenopathy  -STI labs drawn due to tender inguinal lymphadenopathy. Pain most likely due to abscess/cellulitis. Less likely due to syphillis or LGV. Follow up results outpatient.    # Discharge Pain Plan:   - During his hospitalization, Kevin experienced pain due to R groin abscess.  The pain plan for discharge was discussed with Kevin and the plan was created in a collaborative fashion.    - tylenol as needed     Consultations This Hospital Stay   PHARMACY TO DOSE VANCO  MEDICATION HISTORY IP PHARMACY CONSULT    Code Status   Full Code       The patient was discussed with Dr. Tony Chirinos's Family Medicine Inpatient Service  [facility]  [facility]   Pager:[phone]  ___________________________________________________________________  Review of Systems:  CONSTITUTIONAL: NEGATIVE for fever, chills, change in weight  INTEGUMENTARY/SKIN: R groin abscess and erythema  EYES: NEGATIVE for vision changes or irritation  ENT/MOUTH: NEGATIVE for ear, mouth and throat problems  RESP: NEGATIVE for significant cough or SOB  CV: NEGATIVE for chest pain, palpitations or peripheral edema  GI: NEGATIVE for nausea, abdominal pain, heartburn, or change in bowel habits  : NEGATIVE for frequency, dysuria, or hematuria  MUSCULOSKELETAL: NEGATIVE for significant arthralgias or myalgia  NEURO: NEGATIVE for weakness, dizziness or paresthesias  ENDOCRINE: NEGATIVE for temperature intolerance, skin/hair changes  HEME/ALLERGY: NEGATIVE for bleeding problems  PSYCHIATRIC: NEGATIVE for changes in mood or affect  Physical Exam   Vital Signs: Temp: 96.5  F (35.8  C) Temp src: Oral BP: 121/61 Pulse: 84 Heart Rate: 84 Resp: 16 SpO2: 99 % O2 Device: None (Room air)    Weight: 197 lbs 1.46 oz    General Appearance:  laying in bed comfortably, non-toxic, in no acute distress  HEENT: normocephalic, atraumatic, neck range of motion intact  Respiratory: breathing comfortably on room air without increased effort, LCTAB without crackles or wheezes  Cardiovascular: regular rate and rhythm with occasional PACs, no other extra sounds, no edema, distal pulses strong  GI: bowel sounds present, non-tender to palpation, soft, non-distended  Skin: R groin abscess approx 1.5cm x 0.5cm, no fluctuance or drainage; no packing in place; diffuse faint erythematous patch of right groin into inner thigh; warm; mildly tender to touch; no involvement of scrotum or penis       Significant Results and Procedures   Results for orders placed or performed during the hospital encounter of 09/01/19   POC US SOFT TISSUE    Impression    Limited Soft Tissue Ultrasound, performed and interpreted by  me.    Indication:  Skin redness warmth pain. Evaluate for cellulitis vs abscess.     Body location: abdomen    Findings:  There is cobblestoning suggestive of cellulitis in the evaluated area. There is no fluid collection to suggest abscess. No foreign body identified    IMPRESSION: Cellulitis           Pending Results   These results will be followed up by PCP at [facility].     Unresulted Labs Ordered in the Past 30 Days of this Admission     Date and Time Order Name Status Description    9/2/2019 0000 HIV Antigen Antibody Combo In process     9/2/2019 0000 Treponema Abs w Reflex to RPR and Titer In process     9/1/2019 1602 Chlamydia trachomatis PCR In process     9/1/2019 1602 Neisseria gonorrhoea PCR In process     9/1/2019 1005 Wound Culture Aerobic Bacterial Preliminary              Primary Care Physician   Provider Not In System   Establish care at [facility]    Discharge Disposition   Discharged to home  Condition at discharge: Stable    Discharge Orders      Reason for your hospital stay    R groin abscess, cellulitis     Activity    Your activity upon discharge: activity as tolerated     Follow Up and recommended labs and tests    Follow up with [facility] clinic within 7 days for hospital follow- up and to follow up on results with Dr. Long, Dr. Hines, or Dr. Ulloa.    [facility]  [address] [place] [address]  [phone]     Full Code     Diet    Follow this diet upon discharge: Regular Diet Adult     Discharge Medications   Current Discharge Medication List      START taking these medications    Details   sulfamethoxazole-trimethoprim (BACTRIM DS/SEPTRA DS) 800-160 MG tablet Take 1 tablet by mouth every 12 hours  Qty: 20 tablet, Refills: 0    Associated Diagnoses: Cellulitis of groin; Abscess         CONTINUE these medications which have NOT CHANGED    Details   clobetasol propionate (CLOBEX) 0.05 % external shampoo Apply to entire dry scalp then wash out 2 times a week       Fexofenadine HCl (ALLEGRA PO) Take by mouth daily      fluticasone (FLONASE) 50 MCG/ACT nasal spray Spray 2 sprays into both nostrils daily      montelukast (SINGULAIR) 10 MG tablet Take 10 mg by mouth daily         STOP taking these medications       cephALEXin (KEFLEX) 500 MG capsule Comments:   Reason for Stopping:         doxycycline monohydrate (MONODOX) 100 MG capsule Comments:   Reason for Stopping:             Allergies   No Known Allergies

## 2019-09-02 NOTE — PROGRESS NOTES
Previous shift RN removed PIV and went over AVS.  Pt discharged to home at this time.  Will  med at [facility] in [facility].  Able to ambulate to entrance where ride will pick him up.

## 2019-09-02 NOTE — PHARMACY-VANCOMYCIN DOSING SERVICE
Pharmacy Empiric Dose Change Per Policy  Original Dose Ordered: 1000 mg IVPB Q 12 hrs  Dose Changed To: 1500 mg IVPB Q 12 hrs  This dose change was based on the pharmacist's assessment of this patient's age, weight, concurrent drug therapy, treatment goals, whether patient's creatinine clearance adequately indicates renal function (factoring in age, muscle mass, fluid and clinical status), and, if applicable, prior pharmacokinetic data.    Creatinine Clearance=     Estimated Creatinine Clearance: 128.5 mL/min (based on SCr of 1.15 mg/dL).  Will continue to follow and modify dosage according to levels, organ function and clinical condition

## 2019-09-02 NOTE — PLAN OF CARE
Patient A&O x 4 this shift. VSS on RA. Patient up independently. Urine sample sent this shift. IV antibiotics discontinued and patient received first dose of oral Bactrim DS. No c/o pain. Cellulitis within parameter line with improvement. Plan for discharge later afternoon/early evening. Continue with current plan of care.

## 2019-09-02 NOTE — PLAN OF CARE
Admitted for right groin cellulitis vs abscess, s/p I&D x2 with dressing intact, dried drainage on dressing. Redness & warmth to groin and upper thigh, has not extended beyond previously drawn margins (black lines). A&O x4, uses call light appropriately. Declines pain medications. VSS, on room air. Ambulates independently. PIV SL between Vanco & Rocephin infusions. No acute events overnight, continue to monitor and follow POC. Await wound culture results.

## 2019-09-03 ENCOUNTER — PATIENT OUTREACH (OUTPATIENT)
Dept: CARE COORDINATION | Facility: CLINIC | Age: 24
End: 2019-09-03

## 2019-09-03 LAB
HIV 1+2 AB+HIV1 P24 AG SERPL QL IA: NONREACTIVE
N GONORRHOEA DNA SPEC QL NAA+PROBE: NEGATIVE
SPECIMEN SOURCE: NORMAL
T PALLIDUM AB SER QL: NONREACTIVE

## 2019-09-04 ENCOUNTER — TELEPHONE (OUTPATIENT)
Dept: FAMILY MEDICINE | Facility: CLINIC | Age: 24
End: 2019-09-04

## 2019-09-04 DIAGNOSIS — A74.9 CHLAMYDIA: Primary | ICD-10-CM

## 2019-09-04 LAB
BACTERIA SPEC CULT: ABNORMAL
BACTERIA SPEC CULT: ABNORMAL
C TRACH DNA SPEC QL NAA+PROBE: POSITIVE
Lab: ABNORMAL
SPECIMEN SOURCE: ABNORMAL
SPECIMEN SOURCE: ABNORMAL

## 2019-09-04 NOTE — TELEPHONE ENCOUNTER
Attempted to call patient about (+) chlamydia results and (-) gonorrhea results. Recommend azithromycin 1g x 1 dose PO. Refrain from sex for 7 days. Use condoms every time. Have partners get evaluated/treated at [facility].     Wound culture of R groin abscess (+) MRSA. Patient can remain on bactrim. No changes to his current antibiotic regimen.     Left voicemail for patient to call clinic about results. Pt will need azithromycin prescription sent to pharmacy of choice. Will follow up with patient tomorrow.      Augie Long, DO   PGY-1  [facility]  Pager [phone]

## 2019-09-05 RX ORDER — AZITHROMYCIN 500 MG/1
1000 TABLET, FILM COATED ORAL DAILY
Qty: 2 TABLET | Refills: 0 | Status: SHIPPED | OUTPATIENT
Start: 2019-09-05 | End: 2019-09-06

## 2019-09-05 NOTE — TELEPHONE ENCOUNTER
Patient called back and RN was able to relay the exact message from provider regarding the results. He provided me with the pharmacy of his choice. RN sent antibiotic for patient, and transferred him to schedule his follow up appointment. RN reported STI to [facility].    Routing to provider as an FYI that it is taken care of. Patient had no further questions.  Joan Andino RN

## 2019-09-11 ENCOUNTER — OFFICE VISIT (OUTPATIENT)
Dept: FAMILY MEDICINE | Facility: CLINIC | Age: 24
End: 2019-09-11
Payer: COMMERCIAL

## 2019-09-11 VITALS
DIASTOLIC BLOOD PRESSURE: 75 MMHG | HEART RATE: 63 BPM | WEIGHT: 200.2 LBS | BODY MASS INDEX: 26.53 KG/M2 | HEIGHT: 73 IN | OXYGEN SATURATION: 96 % | TEMPERATURE: 98.3 F | SYSTOLIC BLOOD PRESSURE: 122 MMHG | RESPIRATION RATE: 16 BRPM

## 2019-09-11 DIAGNOSIS — Z09 HOSPITAL DISCHARGE FOLLOW-UP: Primary | ICD-10-CM

## 2019-09-11 DIAGNOSIS — A74.9 CHLAMYDIA INFECTION: ICD-10-CM

## 2019-09-11 DIAGNOSIS — L03.314 CELLULITIS OF GROIN: ICD-10-CM

## 2019-09-11 PROBLEM — L02.91 ABSCESS: Status: RESOLVED | Noted: 2019-09-01 | Resolved: 2019-09-11

## 2019-09-11 ASSESSMENT — MIFFLIN-ST. JEOR: SCORE: 1966.98

## 2019-09-11 NOTE — PROGRESS NOTES
Preceptor Attestation:   Patient seen, evaluated and discussed with the resident. I have verified the content of the note, which accurately reflects my assessment of the patient and the plan of care.   Supervising Physician:  Lux Jiang MD

## 2019-09-11 NOTE — PROGRESS NOTES
"  Hospitalization Follow-up Visit         [facility]       Hospital Follow-up Visit:    Hospital:  [facility]   Date of Admission: 9/1/19  Date of Discharge: 9/2/19  Reason(s) for Admission: R groin cellulitis            Problems taking medications regularly:  None       Post Discharge Medication Reconciliation: discharge medications reconciled, continue medications without change.       Problems adhering to non-medication therapy:  None       Medications reviewed by: by myself. Findings include as expected.    Summary of hospitalization:  [facility] discharge summary reviewed. Patient did have positive chlamydia come back positive after discharge and was treated with azithromycin.  Diagnostic Tests/Treatments reviewed. Wound culture grew MRSA, and patient treated appropriate course of bactrim.  Follow up needed: none  Other Healthcare Providers Involved in Patient s Care:         None  Update since discharge: improved.   Plan of care communicated with patient                        Review of Systems:   CONSTITUTIONAL: no fatigue, no unexpected change in weight  SKIN: no worrisome rashes, no worrisome moles, no worrisome lesions  EYES: no acute vision problems or changes  ENT: no ear problems, no mouth problems, no throat problems  RESP: no significant cough, no shortness of breath  CV: no chest pain, no palpitations, no new or worsening peripheral edema  GI: no nausea, no vomiting, no constipation, no diarrhea            Physical Exam:     Vitals:    09/11/19 1510   BP: 122/75   Pulse: 63   Resp: 16   Temp: 98.3  F (36.8  C)   TempSrc: Oral   SpO2: 96%   Weight: 90.8 kg (200 lb 3.2 oz)   Height: 1.854 m (6' 1\")     Body mass index is 26.41 kg/m .    GENERAL:: healthy, alert and no distress  RESP: lungs clear to auscultation - no rales, no rhonchi, no wheezes  CV: regular rates and rhythm, normal S1 S2, no S3 or S4 and no murmur, no click or rub   Skin- right groin incision clean, dry and " intact. No surrounding cellulitis or fluctuance. Mild lymphadenopathy palpated  Psych- appropriate mood and affect         Results:     Results from last visit   Results for orders placed or performed during the hospital encounter of 09/01/19   POC US SOFT TISSUE    Impression    Limited Soft Tissue Ultrasound, performed and interpreted by me.    Indication:  Skin redness warmth pain. Evaluate for cellulitis vs abscess.     Body location: abdomen    Findings:  There is cobblestoning suggestive of cellulitis in the evaluated area. There is no fluid collection to suggest abscess. No foreign body identified    IMPRESSION: Cellulitis       CBC with platelets differential   Result Value Ref Range    WBC 4.8 4.0 - 11.0 10e9/L    RBC Count 4.81 4.4 - 5.9 10e12/L    Hemoglobin 14.5 13.3 - 17.7 g/dL    Hematocrit 44.3 40.0 - 53.0 %    MCV 92 78 - 100 fl    MCH 30.1 26.5 - 33.0 pg    MCHC 32.7 31.5 - 36.5 g/dL    RDW 11.9 10.0 - 15.0 %    Platelet Count 180 150 - 450 10e9/L    Diff Method Automated Method     % Neutrophils 46.3 %    % Lymphocytes 40.5 %    % Monocytes 9.3 %    % Eosinophils 3.1 %    % Basophils 0.6 %    % Immature Granulocytes 0.2 %    Nucleated RBCs 0 0 /100    Absolute Neutrophil 2.2 1.6 - 8.3 10e9/L    Absolute Lymphocytes 2.0 0.8 - 5.3 10e9/L    Absolute Monocytes 0.5 0.0 - 1.3 10e9/L    Absolute Eosinophils 0.2 0.0 - 0.7 10e9/L    Absolute Basophils 0.0 0.0 - 0.2 10e9/L    Abs Immature Granulocytes 0.0 0 - 0.4 10e9/L    Absolute Nucleated RBC 0.0    Comprehensive metabolic panel   Result Value Ref Range    Sodium 140 133 - 144 mmol/L    Potassium 4.1 3.4 - 5.3 mmol/L    Chloride 108 94 - 109 mmol/L    Carbon Dioxide 28 20 - 32 mmol/L    Anion Gap 4 3 - 14 mmol/L    Glucose 90 70 - 99 mg/dL    Urea Nitrogen 15 7 - 30 mg/dL    Creatinine 0.99 0.66 - 1.25 mg/dL    GFR Estimate >90 >60 mL/min/[1.73_m2]    GFR Estimate If Black >90 >60 mL/min/[1.73_m2]    Calcium 9.0 8.5 - 10.1 mg/dL    Bilirubin Total 0.4  0.2 - 1.3 mg/dL    Albumin 3.5 3.4 - 5.0 g/dL    Protein Total 7.5 6.8 - 8.8 g/dL    Alkaline Phosphatase 70 40 - 150 U/L    ALT 29 0 - 70 U/L    AST 17 0 - 45 U/L   CRP inflammation   Result Value Ref Range    CRP Inflammation 24.0 (H) 0.0 - 8.0 mg/L   Lactic acid whole blood   Result Value Ref Range    Lactic Acid 0.8 0.7 - 2.0 mmol/L   Erythrocyte sedimentation rate auto   Result Value Ref Range    Sed Rate 14 0 - 15 mm/h   Basic metabolic panel   Result Value Ref Range    Sodium 138 133 - 144 mmol/L    Potassium 4.0 3.4 - 5.3 mmol/L    Chloride 104 94 - 109 mmol/L    Carbon Dioxide 27 20 - 32 mmol/L    Anion Gap 7 3 - 14 mmol/L    Glucose 84 70 - 99 mg/dL    Urea Nitrogen 16 7 - 30 mg/dL    Creatinine 1.15 0.66 - 1.25 mg/dL    GFR Estimate >90 >60 mL/min/[1.73_m2]    GFR Estimate If Black >90 >60 mL/min/[1.73_m2]    Calcium 9.3 8.5 - 10.1 mg/dL   CBC with platelets   Result Value Ref Range    WBC 4.9 4.0 - 11.0 10e9/L    RBC Count 4.86 4.4 - 5.9 10e12/L    Hemoglobin 14.5 13.3 - 17.7 g/dL    Hematocrit 45.0 40.0 - 53.0 %    MCV 93 78 - 100 fl    MCH 29.8 26.5 - 33.0 pg    MCHC 32.2 31.5 - 36.5 g/dL    RDW 11.9 10.0 - 15.0 %    Platelet Count 179 150 - 450 10e9/L   Treponema Abs w Reflex to RPR and Titer   Result Value Ref Range    Treponema Antibodies Nonreactive NR^Nonreactive   HIV Antigen Antibody Combo   Result Value Ref Range    HIV Antigen Antibody Combo Nonreactive NR^Nonreactive       Wound Culture Aerobic Bacterial   Result Value Ref Range    Specimen Description Groin     Special Requests Specimen collected in eSwab transport (white cap)     Culture Micro (A)      Light growth  Methicillin resistant Staphylococcus aureus (MRSA)      Culture Micro (A)      Light growth  Streptococcus anginosus  Susceptibility testing not routinely done         Susceptibility    Methicillin resistant staphylococcus aureus (mrsa) - JOÃO     CLINDAMYCIN* <=0.25 Sensitive ug/mL      * This isolate DOES NOT demonstrate  inducible clindamycin resistance in vitro. Clindamycin is susceptible and could be used when indicated, however, erythromycin is resistant and should not be used.     ERYTHROMYCIN >=8 Resistant ug/mL     GENTAMICIN <=0.5 Sensitive ug/mL     OXACILLIN >=4 Resistant ug/mL     TETRACYCLINE <=1 Sensitive ug/mL     Trimethoprim/Sulfa <=0.5/9.5 Sensitive ug/mL     VANCOMYCIN 1 Sensitive ug/mL     LINEZOLID 2 Sensitive ug/mL   Gram stain   Result Value Ref Range    Specimen Description Groin     Special Requests Specimen collected in eSwab transport (white cap)     Gram Stain No organisms seen     Gram Stain Moderate  WBC'S seen  predominantly PMN's      Neisseria gonorrhoea PCR   Result Value Ref Range    Specimen Descrip Urine     N Gonorrhea PCR Negative NEG^Negative   Chlamydia trachomatis PCR   Result Value Ref Range    Specimen Description Urine     Chlamydia Trachomatis PCR Positive (A) NEG^Negative       Assessment and Plan      Kevin was seen today for hospital f/u.    Diagnoses and all orders for this visit:    Hospital discharge follow-up    Doing quite well. Completed appropriate antibiotic course( Bactrim for MRSA infection).     Cellulitis of groin  Completely resolved. Still some minor lymphadenopathy that we should check at his next visit, patient informed of this.    Chlamydia infection    Completed azithromycin. Asymptomatic. Provided with education        E&M code to be billed if TCM cannot be: 23411    Type of decision making: Moderate complexity (86538)      Options for treatment and follow-up care were reviewed with the patient  Kevin Davisparashennyarjun   engaged in the decision making process and verbalized understanding of the options discussed and agreed with the final plan.      Twan Clemens, DO

## 2019-10-03 ENCOUNTER — OFFICE VISIT (OUTPATIENT)
Dept: DERMATOLOGY | Facility: CLINIC | Age: 24
End: 2019-10-03
Payer: COMMERCIAL

## 2019-10-03 DIAGNOSIS — L63.9 ALOPECIA AREATA: Primary | ICD-10-CM

## 2019-10-03 ASSESSMENT — PAIN SCALES - GENERAL: PAINLEVEL: NO PAIN (0)

## 2019-10-03 NOTE — NURSING NOTE
Dermatology Rooming Note    Kevin Agee's goals for this visit include:   Chief Complaint   Patient presents with     Hair Loss     Alopecia Areata - unsure of any improvement.      Joyce Parson, CMA

## 2019-10-03 NOTE — PROGRESS NOTES
Drug Administration Record    Prior to injection, verified patient identity using patient's name and date of birth.  Due to injection administration, patient instructed to remain in clinic for 15 minutes  afterwards, and to report any adverse reaction to me immediately.    Drug Name: triamcinolone acetonide(kenalog)  Dose: 1mL of triamcinolone 5mg/mL, 5mg dose  Route administered: ID  NDC #: [id number]: Kenalog-10 (1348-7712-06)  Amount of waste(mL):4.5  Reason for waste: Multi dose vial    LOT #: [id number]  SITE: see note  : [employer]  EXPIRATION DATE: 2/2021

## 2019-10-03 NOTE — PROGRESS NOTES
[facility] Dermatology Note      Dermatology Problem List:  1. Alopecia areata     Encounter Date: Oct 3, 2019    CC:  Chief Complaint   Patient presents with     Hair Loss     Alopecia Areata - unsure of any improvement.          History of Present Illness:  Mr. Kevin Agee is a 23 year old male who presents as a follow-up for alopecia areata. The patient was last seen 8/16/2019 by Kaylah Schulz PA-C for alopecia areata when he was treated with 1 mL ILK10 and continued on Clobetasol shampoo three times weekly. Since his last visit, he has noticed hair regrowth in both locations. The one on his right parietal scalp still persists. He resports 2 small spots on his chin that he recently noticed.  He has been using the Clobetasol shampoo twice weekly.    Of note, he was diagnosed with alopecia areata 6 years ago primarily affecting his scalp. He denies any skin color changes at time of initial hair loss. Denies other personal history of autoimmune disease. Denies alopecia areata or other autoimmune diseases in his family.     Past Medical History:   Patient Active Problem List   Diagnosis     Alopecia areata     Past Medical History:   Diagnosis Date     Alopecia      Cellulitis of groin 9/1/2019     Chlamydia      Flat feet, bilateral      Migraine      Migraines      MRSA (methicillin resistant staph aureus) culture positive      Past Surgical History:   Procedure Laterality Date     ANKLE SURGERY      To fix bilateral flat feet       Social History:  Patient reports that he has never smoked. He has never used smokeless tobacco. He reports current alcohol use. He reports that he does not use drugs.    Family History:  Family History   Problem Relation Age of Onset     Diabetes Maternal Grandmother      Hypertension Maternal Grandfather      Cancer No family hx of      Coronary Artery Disease No family hx of      Heart Disease No family hx of      Melanoma No family hx of      Skin  Cancer No family hx of        Medications:  Current Outpatient Medications   Medication Sig Dispense Refill     clobetasol propionate (CLOBEX) 0.05 % external shampoo Apply to entire dry scalp then wash out 2 times a week       Fexofenadine HCl (ALLEGRA PO) Take by mouth daily       fluticasone (FLONASE) 50 MCG/ACT nasal spray Spray 2 sprays into both nostrils daily       montelukast (SINGULAIR) 10 MG tablet Take 10 mg by mouth daily       sulfamethoxazole-trimethoprim (BACTRIM DS/SEPTRA DS) 800-160 MG tablet Take 1 tablet by mouth every 12 hours 20 tablet 0     No Known Allergies      Review of Systems:  -Const: Denies fevers, chills or changes in weight.   -Constitutional: Otherwise feeling well today, in usual state of health.  -Skin: As above in HPI. No additional skin concerns.    Physical exam:  Vitals: There were no vitals taken for this visit.  GEN: This is a well developed, well-nourished male in no acute distress, in a pleasant mood.    SKIN: Focused examination of the face and scalp was performed.  -Lynn skin type: IV  - On his right occipital scalp there is an ~1cm x 2cm well demarcated patch of nonscarring hair loss with evidence of hair regrowth; no sign of atrophy   - On his midline chin, there are 2 ~1cm circular patches of nonscarring hair loss   -No other lesions of concern on areas examined.     Impression/Plan:  1. Alopecia areata, well controlled     Discussed that given stability and localization to scalp over several years, his alopecia areata will likely resolve in the next couple years     Continue clobetasol shampoo 2-3 times weekly     Kenalog intralesional injection procedure note (performed by faculty): After verbal consent and discussion of risks including but not limited to atrophy, pain, and bruising,  time out was performed, 0.5 total cc of Kenalog 5 mg/cc was injected into 7 sites on the right parietal scalp and under chin.  The patient tolerated the procedure well and left  the Dermatology clinic in good condition.      F/U 6-8 weeks     Staff Involved:  I, Nicole Nolan, saw and examined the patient in the presence of Dr. Shah.    The medical student acted as a scribe with respect to this patient.  I have performed all the key elements of the history and physical, as well as all procedures.    Twan Shah MD  Dermatology Attending

## 2019-10-03 NOTE — LETTER
10/3/2019       RE: Kevin Agee  [address] [facility] [address]     Dear Colleague,    Thank you for referring your patient, Kevin Agee, to the [facility] at [facility]. Please see a copy of my visit note below.    [facility] Dermatology Note      Dermatology Problem List:  1. Alopecia areata     Encounter Date: Oct 3, 2019    CC:  Chief Complaint   Patient presents with     Hair Loss     Alopecia Areata - unsure of any improvement.          History of Present Illness:  Mr. Kevin Agee is a 23 year old male who presents as a follow-up for alopecia areata. The patient was last seen 8/16/2019 by Kaylah Schulz PA-C for alopecia areata when he was treated with 1 mL ILK10 and continued on Clobetasol shampoo three times weekly. Since his last visit, he has noticed hair regrowth in both locations. The one on his right parietal scalp still persists. He resports 2 small spots on his chin that he recently noticed.  He has been using the Clobetasol shampoo twice weekly.    Of note, he was diagnosed with alopecia areata 6 years ago primarily affecting his scalp. He denies any skin color changes at time of initial hair loss. Denies other personal history of autoimmune disease. Denies alopecia areata or other autoimmune diseases in his family.     Past Medical History:   Patient Active Problem List   Diagnosis     Alopecia areata     Past Medical History:   Diagnosis Date     Alopecia      Cellulitis of groin 9/1/2019     Chlamydia      Flat feet, bilateral      Migraine      Migraines      MRSA (methicillin resistant staph aureus) culture positive      Past Surgical History:   Procedure Laterality Date     ANKLE SURGERY      To fix bilateral flat feet       Social History:  Patient reports that he has never smoked. He has never used smokeless tobacco. He reports current alcohol use. He reports that he does not use  drugs.    Family History:  Family History   Problem Relation Age of Onset     Diabetes Maternal Grandmother      Hypertension Maternal Grandfather      Cancer No family hx of      Coronary Artery Disease No family hx of      Heart Disease No family hx of      Melanoma No family hx of      Skin Cancer No family hx of        Medications:  Current Outpatient Medications   Medication Sig Dispense Refill     clobetasol propionate (CLOBEX) 0.05 % external shampoo Apply to entire dry scalp then wash out 2 times a week       Fexofenadine HCl (ALLEGRA PO) Take by mouth daily       fluticasone (FLONASE) 50 MCG/ACT nasal spray Spray 2 sprays into both nostrils daily       montelukast (SINGULAIR) 10 MG tablet Take 10 mg by mouth daily       sulfamethoxazole-trimethoprim (BACTRIM DS/SEPTRA DS) 800-160 MG tablet Take 1 tablet by mouth every 12 hours 20 tablet 0     No Known Allergies      Review of Systems:  -Const: Denies fevers, chills or changes in weight.   -Constitutional: Otherwise feeling well today, in usual state of health.  -Skin: As above in HPI. No additional skin concerns.    Physical exam:  Vitals: There were no vitals taken for this visit.  GEN: This is a well developed, well-nourished male in no acute distress, in a pleasant mood.    SKIN: Focused examination of the face and scalp was performed.  -Lynn skin type: IV  - On his right occipital scalp there is an ~1cm x 2cm well demarcated patch of nonscarring hair loss with evidence of hair regrowth; no sign of atrophy   - On his midline chin, there are 2 ~1cm circular patches of nonscarring hair loss   -No other lesions of concern on areas examined.     Impression/Plan:  1. Alopecia areata, well controlled     Discussed that given stability and localization to scalp over several years, his alopecia areata will likely resolve in the next couple years     Continue clobetasol shampoo 2-3 times weekly     Kenalog intralesional injection procedure note (performed  by faculty): After verbal consent and discussion of risks including but not limited to atrophy, pain, and bruising,  time out was performed, 0.5 total cc of Kenalog 5 mg/cc was injected into 7 sites on the right parietal scalp and under chin.  The patient tolerated the procedure well and left the Dermatology clinic in good condition.      F/U 6-8 weeks     Staff Involved:  I, Nicole Nolan, saw and examined the patient in the presence of Dr. Shah.    The medical student acted as a scribe with respect to this patient.  I have performed all the key elements of the history and physical, as well as all procedures.    Twan Shah MD  Dermatology Attending    Drug Administration Record    Prior to injection, verified patient identity using patient's name and date of birth.  Due to injection administration, patient instructed to remain in clinic for 15 minutes  afterwards, and to report any adverse reaction to me immediately.    Drug Name: triamcinolone acetonide(kenalog)  Dose: 1mL of triamcinolone 5mg/mL, 5mg dose  Route administered: ID  NDC #: [id number]: Kenalog-10 (1770-3994-40)  Amount of waste(mL):4.5  Reason for waste: Multi dose vial    LOT #: [id number]  SITE: see note  : [employer]  EXPIRATION DATE: 2/2021      Again, thank you for allowing me to participate in the care of your patient.      Sincerely,    Twan Shah MD

## 2019-11-14 ENCOUNTER — OFFICE VISIT (OUTPATIENT)
Dept: DERMATOLOGY | Facility: CLINIC | Age: 24
End: 2019-11-14
Payer: COMMERCIAL

## 2019-11-14 DIAGNOSIS — L72.0 MILIA: ICD-10-CM

## 2019-11-14 DIAGNOSIS — L63.9 ALOPECIA AREATA: Primary | ICD-10-CM

## 2019-11-14 ASSESSMENT — PAIN SCALES - GENERAL
PAINLEVEL: NO PAIN (1)
PAINLEVEL: NO PAIN (0)

## 2019-11-14 NOTE — PROGRESS NOTES
Drug Administration Record    Prior to injection, verified patient identity using patient's name and date of birth.  Due to injection administration, patient instructed to remain in clinic for 15 minutes  afterwards, and to report any adverse reaction to me immediately.    Drug Name: triamcinolone acetonide(kenalog)  Dose: 0.4mL of triamcinolone 10mg/mL, 14mg dose  Route administered: ID  NDC #: [id number]: Kenalog-10 (5440-8661-99)  Amount of waste(mL):4.6  Reason for waste: Multi dose vial    LOT #: [id number]  SITE: see note  : [facility]  EXPIRATION DATE: 3/2021

## 2019-11-14 NOTE — NURSING NOTE
Dermatology Rooming Note    Kevin Agee's goals for this visit include:   Chief Complaint   Patient presents with     Derm Problem     Hairloss f/u, Kevin states his hair is doing better.      Brittany Collins LPN

## 2019-11-14 NOTE — PROGRESS NOTES
[facility] Dermatology Note      Dermatology Problem List:  1. Alopecia areata  2. Milia around eyes  -test spot    Encounter Date: Nov 14, 2019    CC:  Chief Complaint   Patient presents with     Derm Problem     Hairgeo f/uKevin states his hair is doing better.          History of Present Illness:  Mr. Kevin Agee is a 23 year old male who presents as a follow-up for alopecia areata. The patient was last seen 10/03/2019 when he was received ILK injection and continued on clobetasol shampoo. The patient has one patch of alopecia areata on right posterior parietal scalp which he reports has hair regrowth. He has also noticed two very small areas of hair loss, one on his chin and another on his upper lip. He does not believe they require treatment. Has been using clobetasol shampoo two times per week. Uses Head & Shoulders shampoo and conditioner otherwise. Has some milia around eyes. Denies any other hair loss or skin issue.    Past Medical History:   Patient Active Problem List   Diagnosis     Alopecia areata     Past Medical History:   Diagnosis Date     Alopecia      Cellulitis of groin 9/1/2019     Chlamydia      Flat feet, bilateral      Migraine      Migraines      MRSA (methicillin resistant staph aureus) culture positive      Past Surgical History:   Procedure Laterality Date     ANKLE SURGERY      To fix bilateral flat feet       Social History:  Patient reports that he has never smoked. He has never used smokeless tobacco. He reports current alcohol use. He reports that he does not use drugs.    Family History:  Family History   Problem Relation Age of Onset     Diabetes Maternal Grandmother      Hypertension Maternal Grandfather      Cancer No family hx of      Coronary Artery Disease No family hx of      Heart Disease No family hx of      Melanoma No family hx of      Skin Cancer No family hx of        Medications:  Current Outpatient Medications   Medication Sig Dispense  Refill     clobetasol propionate (CLOBEX) 0.05 % external shampoo Apply to entire dry scalp then wash out 2 times a week       Fexofenadine HCl (ALLEGRA PO) Take by mouth daily       fluticasone (FLONASE) 50 MCG/ACT nasal spray Spray 2 sprays into both nostrils daily       montelukast (SINGULAIR) 10 MG tablet Take 10 mg by mouth daily       sulfamethoxazole-trimethoprim (BACTRIM DS/SEPTRA DS) 800-160 MG tablet Take 1 tablet by mouth every 12 hours (Patient not taking: Reported on 11/14/2019) 20 tablet 0     No Known Allergies      Review of Systems:  -As per HPI  -Constitutional: Otherwise feeling well today, in usual state of health.  -HEENT: Patient denies nonhealing oral sores.  -Skin: As above in HPI. No additional skin concerns.    Physical exam:  Vitals: There were no vitals taken for this visit.  GEN: This is a well developed, well-nourished male in no acute distress, in a pleasant mood.    SKIN: Focused examination of the scalp and face was performed.  -Lynn skin type: V  -Decreased hair density on a patch at the right posterior parietal scalp  -Focal loss of a few hair follicles on the chin and upper lip  -Milia around eyes  -No other lesions of concern on areas examined.     Impression/Plan:  1. Alopecia areata    Kenalog intralesional injection procedure note (performed by faculty): After verbal consent and discussion of risks including but not limited to atrophy, pain, and bruising,  time out was performed, 0.4 total cc of Kenalog 10 mg/cc was injected into 3 sites on the right posterior parietal scalp. The patient tolerated the procedure well and left the Dermatology clinic in good condition.    Discussed progress made since last visit and likelihood of not needing another injection at the next visit.    2. Milia    Benign nature discussed    Test spot: gentle extraction performed - surface nicked lightly with 30 gauge needle, gentle lateral pressure applied with cotton swabs. Tolerated without  complication.  Verbal consent obtained after discussion of possible side effect of post inflammatory hyperpigmentation.  No further intervention required at this time.     CC Referred Self, MD  No address on file on close of this encounter.  Follow-up in 3 months, earlier for new or changing lesions.     Staff Involved:  I, Mina Chavez MS3, saw and examined the patient in the presence of Dr. Twan Shah.    The medical student acted as a scribe with respect to this patient.  I have performed all the key elements of the history and physical, as well as all procedures.    Twan Shah MD  Dermatology Attending

## 2019-11-14 NOTE — LETTER
11/14/2019       RE: Kevin Agee  [address] [facility] [address]     Dear Colleague,    Thank you for referring your patient, Kevin Agee, to the [facility] at [facility]. Please see a copy of my visit note below.    [facility] Dermatology Note      Dermatology Problem List:  1. Alopecia areata  2. Milia around eyes  -test spot    Encounter Date: Nov 14, 2019    CC:  Chief Complaint   Patient presents with     Derm Problem     Hairloss f/u, Kevin states his hair is doing better.          History of Present Illness:  Mr. Kevin Agee is a 23 year old male who presents as a follow-up for alopecia areata. The patient was last seen 10/03/2019 when he was received ILK injection and continued on clobetasol shampoo. The patient has one patch of alopecia areata on right posterior parietal scalp which he reports has hair regrowth. He has also noticed two very small areas of hair loss, one on his chin and another on his upper lip. He does not believe they require treatment. Has been using clobetasol shampoo two times per week. Uses Head & Shoulders shampoo and conditioner otherwise. Has some milia around eyes. Denies any other hair loss or skin issue.    Past Medical History:   Patient Active Problem List   Diagnosis     Alopecia areata     Past Medical History:   Diagnosis Date     Alopecia      Cellulitis of groin 9/1/2019     Chlamydia      Flat feet, bilateral      Migraine      Migraines      MRSA (methicillin resistant staph aureus) culture positive      Past Surgical History:   Procedure Laterality Date     ANKLE SURGERY      To fix bilateral flat feet       Social History:  Patient reports that he has never smoked. He has never used smokeless tobacco. He reports current alcohol use. He reports that he does not use drugs.    Family History:  Family History   Problem Relation Age of Onset     Diabetes Maternal Grandmother       Hypertension Maternal Grandfather      Cancer No family hx of      Coronary Artery Disease No family hx of      Heart Disease No family hx of      Melanoma No family hx of      Skin Cancer No family hx of        Medications:  Current Outpatient Medications   Medication Sig Dispense Refill     clobetasol propionate (CLOBEX) 0.05 % external shampoo Apply to entire dry scalp then wash out 2 times a week       Fexofenadine HCl (ALLEGRA PO) Take by mouth daily       fluticasone (FLONASE) 50 MCG/ACT nasal spray Spray 2 sprays into both nostrils daily       montelukast (SINGULAIR) 10 MG tablet Take 10 mg by mouth daily       sulfamethoxazole-trimethoprim (BACTRIM DS/SEPTRA DS) 800-160 MG tablet Take 1 tablet by mouth every 12 hours (Patient not taking: Reported on 11/14/2019) 20 tablet 0     No Known Allergies      Review of Systems:  -As per HPI  -Constitutional: Otherwise feeling well today, in usual state of health.  -HEENT: Patient denies nonhealing oral sores.  -Skin: As above in HPI. No additional skin concerns.    Physical exam:  Vitals: There were no vitals taken for this visit.  GEN: This is a well developed, well-nourished male in no acute distress, in a pleasant mood.    SKIN: Focused examination of the scalp and face was performed.  -Lynn skin type: V  -Decreased hair density on a patch at the right posterior parietal scalp  -Focal loss of a few hair follicles on the chin and upper lip  -Milia around eyes  -No other lesions of concern on areas examined.     Impression/Plan:  1. Alopecia areata    Kenalog intralesional injection procedure note (performed by faculty): After verbal consent and discussion of risks including but not limited to atrophy, pain, and bruising,  time out was performed, 0.4 total cc of Kenalog 10 mg/cc was injected into 3 sites on the right posterior parietal scalp. The patient tolerated the procedure well and left the Dermatology clinic in good condition.    Discussed progress made  since last visit and likelihood of not needing another injection at the next visit.    2. Milia    Benign nature discussed    Test spot: gentle extraction performed - surface nicked lightly with 30 gauge needle, gentle lateral pressure applied with cotton swabs. Tolerated without complication.  Verbal consent obtained after discussion of possible side effect of post inflammatory hyperpigmentation.  No further intervention required at this time.     CC Referred Self, MD  No address on file on close of this encounter.  Follow-up in 3 months, earlier for new or changing lesions.     Staff Involved:  I, Mina Chavez MS3, saw and examined the patient in the presence of Dr. Twan Shah.    The medical student acted as a scribe with respect to this patient.  I have performed all the key elements of the history and physical, as well as all procedures.    Twan Shah MD  Dermatology Attending

## 2019-11-24 PROBLEM — L72.0 MILIA: Status: ACTIVE | Noted: 2019-11-24

## 2020-03-10 ENCOUNTER — HEALTH MAINTENANCE LETTER (OUTPATIENT)
Age: 25
End: 2020-03-10

## 2020-12-27 ENCOUNTER — HEALTH MAINTENANCE LETTER (OUTPATIENT)
Age: 25
End: 2020-12-27

## 2021-04-24 ENCOUNTER — HEALTH MAINTENANCE LETTER (OUTPATIENT)
Age: 26
End: 2021-04-24

## 2021-10-09 ENCOUNTER — HEALTH MAINTENANCE LETTER (OUTPATIENT)
Age: 26
End: 2021-10-09

## 2022-05-16 ENCOUNTER — HEALTH MAINTENANCE LETTER (OUTPATIENT)
Age: 27
End: 2022-05-16

## 2022-09-11 ENCOUNTER — HEALTH MAINTENANCE LETTER (OUTPATIENT)
Age: 27
End: 2022-09-11

## 2023-06-03 ENCOUNTER — HEALTH MAINTENANCE LETTER (OUTPATIENT)
Age: 28
End: 2023-06-03